# Patient Record
Sex: MALE | Race: WHITE | HISPANIC OR LATINO | Employment: FULL TIME | URBAN - METROPOLITAN AREA
[De-identification: names, ages, dates, MRNs, and addresses within clinical notes are randomized per-mention and may not be internally consistent; named-entity substitution may affect disease eponyms.]

---

## 2017-02-13 ENCOUNTER — HOSPITAL ENCOUNTER (EMERGENCY)
Facility: HOSPITAL | Age: 42
Discharge: HOME | End: 2017-02-13
Attending: EMERGENCY MEDICINE

## 2017-02-13 ENCOUNTER — APPOINTMENT (EMERGENCY)
Dept: CT IMAGING | Facility: HOSPITAL | Age: 42
End: 2017-02-13

## 2017-02-13 VITALS
HEART RATE: 76 BPM | DIASTOLIC BLOOD PRESSURE: 78 MMHG | TEMPERATURE: 98.1 F | OXYGEN SATURATION: 98 % | SYSTOLIC BLOOD PRESSURE: 126 MMHG | RESPIRATION RATE: 18 BRPM

## 2017-02-13 DIAGNOSIS — R10.9 RIGHT FLANK PAIN: Primary | ICD-10-CM

## 2017-02-13 LAB
ALBUMIN SERPL BCP-MCNC: 3.6 G/DL (ref 3.5–5)
ALP SERPL-CCNC: 134 U/L (ref 46–116)
ALT SERPL W P-5'-P-CCNC: 45 U/L (ref 12–78)
ANION GAP SERPL CALCULATED.3IONS-SCNC: 6 MMOL/L (ref 4–13)
AST SERPL W P-5'-P-CCNC: 23 U/L (ref 5–45)
BASOPHILS # BLD AUTO: 0.05 THOUSANDS/ΜL (ref 0–0.1)
BASOPHILS NFR BLD AUTO: 1 % (ref 0–1)
BILIRUB SERPL-MCNC: 0.2 MG/DL (ref 0.2–1)
BUN SERPL-MCNC: 15 MG/DL (ref 5–25)
CALCIUM SERPL-MCNC: 8.5 MG/DL (ref 8.3–10.1)
CHLORIDE SERPL-SCNC: 106 MMOL/L (ref 100–108)
CLARITY, POC: CLEAR
CO2 SERPL-SCNC: 28 MMOL/L (ref 21–32)
COLOR, POC: YELLOW
CREAT SERPL-MCNC: 0.91 MG/DL (ref 0.6–1.3)
EOSINOPHIL # BLD AUTO: 0.2 THOUSAND/ΜL (ref 0–0.61)
EOSINOPHIL NFR BLD AUTO: 3 % (ref 0–6)
ERYTHROCYTE [DISTWIDTH] IN BLOOD BY AUTOMATED COUNT: 13.7 % (ref 11.6–15.1)
EXT BILIRUBIN, UA: NEGATIVE
EXT BLOOD URINE: NEGATIVE
EXT GLUCOSE, UA: NEGATIVE
EXT KETONES: NEGATIVE
EXT NITRITE, UA: NEGATIVE
EXT PH, UA: 6
EXT PROTEIN, UA: NEGATIVE
EXT SPECIFIC GRAVITY, UA: 1.01
EXT UROBILINOGEN: 0.2
GFR SERPL CREATININE-BSD FRML MDRD: >60 ML/MIN/1.73SQ M
GLUCOSE SERPL-MCNC: 98 MG/DL (ref 65–140)
HCT VFR BLD AUTO: 50.1 % (ref 36.5–49.3)
HGB BLD-MCNC: 16.8 G/DL (ref 12–17)
LYMPHOCYTES # BLD AUTO: 2.43 THOUSANDS/ΜL (ref 0.6–4.47)
LYMPHOCYTES NFR BLD AUTO: 31 % (ref 14–44)
MCH RBC QN AUTO: 28.3 PG (ref 26.8–34.3)
MCHC RBC AUTO-ENTMCNC: 33.5 G/DL (ref 31.4–37.4)
MCV RBC AUTO: 84 FL (ref 82–98)
MONOCYTES # BLD AUTO: 0.48 THOUSAND/ΜL (ref 0.17–1.22)
MONOCYTES NFR BLD AUTO: 6 % (ref 4–12)
NEUTROPHILS # BLD AUTO: 4.57 THOUSANDS/ΜL (ref 1.85–7.62)
NEUTS SEG NFR BLD AUTO: 59 % (ref 43–75)
PLATELET # BLD AUTO: 219 THOUSANDS/UL (ref 149–390)
PMV BLD AUTO: 11.2 FL (ref 8.9–12.7)
POTASSIUM SERPL-SCNC: 3.9 MMOL/L (ref 3.5–5.3)
PROT SERPL-MCNC: 6.8 G/DL (ref 6.4–8.2)
RBC # BLD AUTO: 5.94 MILLION/UL (ref 3.88–5.62)
SODIUM SERPL-SCNC: 140 MMOL/L (ref 136–145)
WBC # BLD AUTO: 7.73 THOUSAND/UL (ref 4.31–10.16)
WBC # BLD EST: NEGATIVE 10*3/UL

## 2017-02-13 PROCEDURE — 87491 CHLMYD TRACH DNA AMP PROBE: CPT | Performed by: PHYSICIAN ASSISTANT

## 2017-02-13 PROCEDURE — 99284 EMERGENCY DEPT VISIT MOD MDM: CPT

## 2017-02-13 PROCEDURE — 85025 COMPLETE CBC W/AUTO DIFF WBC: CPT | Performed by: PHYSICIAN ASSISTANT

## 2017-02-13 PROCEDURE — 36415 COLL VENOUS BLD VENIPUNCTURE: CPT | Performed by: PHYSICIAN ASSISTANT

## 2017-02-13 PROCEDURE — 81002 URINALYSIS NONAUTO W/O SCOPE: CPT | Performed by: PHYSICIAN ASSISTANT

## 2017-02-13 PROCEDURE — 74176 CT ABD & PELVIS W/O CONTRAST: CPT

## 2017-02-13 PROCEDURE — 96374 THER/PROPH/DIAG INJ IV PUSH: CPT

## 2017-02-13 PROCEDURE — 87591 N.GONORRHOEAE DNA AMP PROB: CPT | Performed by: PHYSICIAN ASSISTANT

## 2017-02-13 PROCEDURE — 80053 COMPREHEN METABOLIC PANEL: CPT | Performed by: PHYSICIAN ASSISTANT

## 2017-02-13 RX ORDER — KETOROLAC TROMETHAMINE 30 MG/ML
30 INJECTION, SOLUTION INTRAMUSCULAR; INTRAVENOUS ONCE
Status: COMPLETED | OUTPATIENT
Start: 2017-02-13 | End: 2017-02-13

## 2017-02-13 RX ORDER — NAPROXEN 500 MG/1
500 TABLET ORAL 2 TIMES DAILY WITH MEALS
Qty: 20 TABLET | Refills: 0 | Status: SHIPPED | OUTPATIENT
Start: 2017-02-13 | End: 2017-03-15

## 2017-02-13 RX ADMIN — KETOROLAC TROMETHAMINE 30 MG: 30 INJECTION, SOLUTION INTRAMUSCULAR at 15:10

## 2017-02-17 LAB
CHLAMYDIA DNA CVX QL NAA+PROBE: NORMAL
N GONORRHOEA DNA GENITAL QL NAA+PROBE: NORMAL

## 2018-07-18 ENCOUNTER — HOSPITAL ENCOUNTER (EMERGENCY)
Facility: HOSPITAL | Age: 43
Discharge: HOME | End: 2018-07-18
Attending: EMERGENCY MEDICINE

## 2018-07-18 VITALS
TEMPERATURE: 98.7 F | BODY MASS INDEX: 25.74 KG/M2 | WEIGHT: 164 LBS | HEART RATE: 70 BPM | DIASTOLIC BLOOD PRESSURE: 74 MMHG | SYSTOLIC BLOOD PRESSURE: 122 MMHG | OXYGEN SATURATION: 97 % | RESPIRATION RATE: 18 BRPM

## 2018-07-18 DIAGNOSIS — M25.50 JOINT PAIN: ICD-10-CM

## 2018-07-18 DIAGNOSIS — G89.29 CHRONIC PAIN: Primary | ICD-10-CM

## 2018-07-18 PROCEDURE — 99283 EMERGENCY DEPT VISIT LOW MDM: CPT

## 2018-07-18 RX ORDER — METHYLPREDNISOLONE 4 MG/1
TABLET ORAL
Qty: 21 TABLET | Refills: 0 | Status: SHIPPED | OUTPATIENT
Start: 2018-07-18 | End: 2018-07-18

## 2018-07-18 RX ORDER — METHYLPREDNISOLONE 4 MG/1
TABLET ORAL
Qty: 21 TABLET | Refills: 0 | Status: SHIPPED | OUTPATIENT
Start: 2018-07-18 | End: 2018-08-07

## 2018-07-18 NOTE — ED PROVIDER NOTES
History  Chief Complaint   Patient presents with    Joint Swelling     c/o increased pain/swelling of bilateral wrists/hands and ankles x 3 weeks  Pt stated "its been going on for 5 years now but its much worse now" pt denies N/V/D, fever/chills     This is a 78-year-old male patient who has had pain and swelling intermittently in his wrists and hands for approximately 5 years he has never been seen by a practitioner  He comes in today because he is having a flare-up of his pain and took nothing over-the-counter  Nothing makes it better or worse he is eating and drinking no fever no chills no headache no blurred vision or double vision no cough congestion sore throat no nausea vomiting diarrhea abdominal pain no weight loss  No redness of the joint  History and physical were done with the  which is his significant other  She states that they brought him in today because he is having a flare-up of his pain  I did explain to them due to fact that this is 9years old and there is no obvious swelling or redness of the joints that at this time I have no workup offer them because this is a chronic condition that is not life threatening but I would be willing to give him something for the inflammatory portion that he describes and refer him to a family doctor who can do a proper workup of chronic pain  Patient is nontoxic in no acute distress eating drinking making urine and stool eating drinking normally            Prior to Admission Medications   Prescriptions Last Dose Informant Patient Reported? Taking?   naproxen (EC NAPROSYN) 500 MG EC tablet   No No   Sig: Take 1 tablet (500 mg total) by mouth 2 (two) times a day with meals for 10 days  Facility-Administered Medications: None       History reviewed  No pertinent past medical history  History reviewed  No pertinent surgical history  History reviewed  No pertinent family history    I have reviewed and agree with the history as documented  Social History   Substance Use Topics    Smoking status: Never Smoker    Smokeless tobacco: Never Used    Alcohol use No        Review of Systems   All other systems reviewed and are negative  Physical Exam  Physical Exam   Constitutional: He appears well-developed and well-nourished  HENT:   Head: Normocephalic and atraumatic  Right Ear: External ear normal    Left Ear: External ear normal    Nose: Nose normal    Mouth/Throat: Oropharynx is clear and moist    Eyes: Conjunctivae are normal  Pupils are equal, round, and reactive to light  Neck: Normal range of motion  Neck supple  Cardiovascular: Normal rate and regular rhythm  Pulmonary/Chest: Effort normal and breath sounds normal    Abdominal: Soft  Bowel sounds are normal  There is no tenderness  Musculoskeletal: Normal range of motion  He exhibits no edema, tenderness or deformity  Full range of motion of both joints no swelling no redness no warmth blood vessels were equally seen or palpated on both hands no neck pain   Neurological: He is alert  Skin: Skin is warm  Psychiatric: He has a normal mood and affect  His behavior is normal    Nursing note and vitals reviewed        Vital Signs  ED Triage Vitals [07/18/18 1350]   Temperature Pulse Respirations Blood Pressure SpO2   98 7 °F (37 1 °C) 70 18 122/74 97 %      Temp Source Heart Rate Source Patient Position - Orthostatic VS BP Location FiO2 (%)   Oral Monitor Sitting Left arm --      Pain Score       6           Vitals:    07/18/18 1350   BP: 122/74   Pulse: 70   Patient Position - Orthostatic VS: Sitting       Visual Acuity      ED Medications  Medications - No data to display    Diagnostic Studies  Results Reviewed     None                 No orders to display              Procedures  Procedures       Phone Contacts  ED Phone Contact    ED Course                               [facility]  CritCare Time    Disposition  Final diagnoses:   Chronic pain   Joint pain     Time reflects when diagnosis was documented in both MDM as applicable and the Disposition within this note     Time User Action Codes Description Comment    7/18/2018  3:30 PM Anthony Fabens [G89 29] Chronic pain     7/18/2018  3:30 PM Tomi[address]la [address] [M25 50] Joint pain       ED Disposition     ED Disposition Condition Comment    Discharge  [address] [facility] discharge to home/self care  Condition at discharge: Good        Follow-up Information     Follow up With Specialties Details Why [id number] Lexa Ceballos [facility], DO Internal Medicine Schedule an appointment as soon as possible for a visit  [address] [facility]  [phone]            Patient's Medications   Discharge Prescriptions    METHYLPREDNISOLONE 4 MG TBPK    Use as directed on package       Start Date: 7/18/2018 End Date: --       Order Dose: --       Quantity: 21 tablet    Refills: 0     No discharge procedures on file      ED Provider  Electronically Signed by           Keke Cedillo PA-C  07/18/18 Gia Mcleod PA-C  07/18/18 1172

## 2018-07-18 NOTE — DISCHARGE INSTRUCTIONS
Artralgia   LO QUE NECESITA SABER:   La artralgia es dolor en mayank o más articulaciones, dinah sin inflamación  El dolor podría ser de corta duración y mejorar dentro de 6 a 8 semanas  La artralgia puede ser mayank señal temprana de artritis  La artralgia puede ser causada por mayank condición médica mitzy un trastorno hormonal o un tumor  Además podría ser la consecuencia de mayank infección o Gini Dial  INSTRUCCIONES SOBRE EL DEBORAH HOSPITALARIA:   Medicamentos:  A usted le pueden  prescribir los siguientes medicamentos:  · El acetaminofén  braden el dolor  Pregunte la cantidad y la frecuencia con que debe tomarlos  [address]  El acetaminofén puede causar daño en el hígado cuando no se carley de forma correcta  · AINEs (Analgésicos antiinflamatorios no esteroides)  disminuyen el dolor y previenen la inflamación  Consulte con bustillo médico cuál medicamento es el adecuado para usted  Pregunte cuánto ward y cuándo  Tómelos mitzy se le indique  Cuando no se abdulaziz de la [employer], los medicamentos antiinflamatorios no esteroides pueden causar sangrado estomacal y problemas renales  · La crema para el alivio del dolor  braden el dolor  [address]  · [place] alexi medicamentos mitzy se le haya indicado  Consulte con bustillo médico si usted stan que bustillo medicamento no le está ayudando o si presenta efectos secundarios  Infórmele si es alérgico a algún medicamento  Mantenga mayank lista actualizada de los [place], las vitaminas y los productos herbales que carley  Incluya los siguientes datos de los medicamentos: cantidad, frecuencia y motivo de administración  Traiga con usted la lista o los envases de la píldoras a alexi citas de seguimiento  Lleve la lista de los medicamentos con usted en isidoro de mayank emergencia  Eric mayank anna marie de control con bustillo médico o especialista mitzy se lo indicaron:  Anote alexi preguntas para que se acuerde de hacerlas payal alexi visitas     Cuidados personales:   · La aplicación de calor  para ayudar a reducir el dolor  Use mayank compresa o envoltura caliente  Aplíquese calor de 20 a 30 minutos cada 2 horas payal los días que le indiquen  · Descanse  lo más posible  Evite actividades que causan [employer] articulaciones  · Aplique hielo  para ayudar a bajar la inflamación y el dolor  El hielo también puede contribuir a evitar el daño de los tejidos  Use un paquete de hielo o ponga hielo molido dentro de [employer]  Cúbrala con mayank toalla y póngasela en la articulación adolorida cada hora payal 15 o 20 minutos o según se le haya indicado  · [place]  la articulación con mayank férula o envoltura elástica según indicaciones  · Eleve  la articulación de [employer] que quede por encima del nivel de bustillo corazón tan seguido mitzy pueda para ayudar a reducir la inflamación y el dolor  Use almohadas o cobijas dobladas para elevar la articulación de forma cómoda  · Pierda peso  si tiene sobrepeso  El peso extra puede ejercer presión sobre alexi articulaciones y causarle más dolor  Consulte con bustillo médico cuánto debería pesar  También pídale que le ayude a diseñar un buen plan de pérdida de peso  · El ejercicio  con regularidad para ayudar a mejorar el movimiento de las articulaciones y disminuir el dolor  Pida más información acerca de un plan de ejercicio adecuado para usted  Los ejercicios de bajo impacto pueden ayudar a quitar algo de la presión en las articulaciones  Vianney Ogles de ejercicios de bajo impacto son caminar, nadar y practicar aeróbicos acuáticos  Fisioterapia:  Un fisioterapeuta le puede enseñar ejercicios para ayudarle a mejorar el movimiento y la fuerza, y para disminuir el dolor  Pregúntele a bustillo médico si la fisioterapia es apropiada para usted  Comuníquese con bustillo especialista o médico sí:   · Usted tiene fiebre  · Usted continúa sintiendo dolor en las articulaciones y el dolor no se le braden con calor, hielo o medicamento      · Usted tiene dolor e inflamación alrededor de la articulación  · Usted tiene preguntas o inquietudes acerca de simon condición o cuidado  Regrese a la sahra de emergencias si:   · Usted de repente siente un dolor severo cuando mueve la articulación  · Usted tiene fiebre y escalofríos cecilia  · Usted no puede  la articulación  · Usted pierde sensibilidad en el lado del cuerpo donde está la articulación adolorida  © 2017 [address] Information is for End User's use only and may not be sold, redistributed or otherwise used for commercial purposes  All illustrations and images included in CareNotes® are the copyrighted property of A D A M , Inc  or Adriel Lizarraga  Esta información es sólo para uso en educación  Simon intención no es darle un consejo médico sobre enfermedades o tratamientos  Colsulte con simon [place] farmacéutico antes de seguir cualquier régimen médico para saber si es seguro y efectivo para usted  Dolor crónico   LO QUE NECESITA SABER:   El dolor crónico es un dolor que no mejora payal 3 meses o [place]  El dolor crónico podría doler todo el [place], o ir y venir  INSTRUCCIONES SOBRE EL DEBORAH HOSPITALARIA:   Llame al 911 o dada que alguien llame al 911 en cualquiera de los siguientes casos:   · Usted está respirando más lento de lo normal, o tiene dificultad para respirar  · No lo pueden despertar  · Usted sufre mayank convulsión  Regrese a la sahra de emergencias si:   · Simon corazón late más lento de lo normal     · Usted siente que simon corazón se le va a salir del pecho o siente palpitaciones  · No puede pensar con claridad  Pregúntele a simon [employer] vitaminas y minerales son adecuados para usted  · Usted tiene efectos secundarios debido al [place] prescrito para el dolor, mitzy comezón, náusea o vómito  · Usted tiene dificultad para dormir  · Simon dolor empeora, incluso después de ward [employer]      · Usted no stan que el medicamento esté funcionando  · Usted tiene preguntas o inquietudes acerca de bustillo condición o cuidado  Medicamentos:  Es posible que usted necesite alguno de los siguientes:  · El acetaminofén  braden el dolor  Está disponible sin receta médica  Pregunte la cantidad y la frecuencia con que debe tomarlos  [address]  Tiffanie las etiquetas de todos los demás medicamentos que esté usando para saber si también contienen acetaminofén, o pregunte a bustillo médico o farmacéutico  El acetaminofén puede causar daño en el hígado cuando no se carley de forma correcta  No use más de 4 gramos (4000 miligramos) en total de acetaminofeno en un día  · AINEs (Analgésicos antiinflamatorios no esteroides) mitzy el ibuprofeno, ayudan a disminuir la inflamación, el dolor y la [place]  Adela medicamento esta disponible con o sin mayank receta médica  Los AINEs pueden causar sangrado estomacal o problemas renales en ciertas personas  Si usted carley un medicamento anticoagulante, siempre pregúntele a bustillo médico si los FRANCISCO son seguros para usted  Siempre tiffanie la etiqueta de adela medicamento y [place] instrucciones  · Un medicamento con receta para el dolor  conocidos mitzy narcóticos u opioides, podrían administrarse  Pregunte al médico cómo debe ward adela medicamento de forma dinh  · Los anestésicos  se pueden frotar sobre la piel o se inyectan en un nervio o músculo para entumecer el área  · Otros medicamentos  podrían reducir [employer], la ansiedad, la tensión muscular, o la inflamación  · [place] alexi medicamentos mitzy se le haya indicado  Consulte con bustillo médico si usted stan que bustillo medicamento no le está ayudando o si presenta efectos secundarios  Infórmele si es alérgico a algún medicamento  Mantenga mayank lista actualizada de los [employer], las vitaminas y los productos herbales que carley  Incluya los siguientes datos de los medicamentos: cantidad, frecuencia y motivo de administración   Larraine Ranger con usted la lista o los envases de la píldoras a alexi citas de seguimiento  Lleve la lista de los medicamentos con usted en isidoro de mayank emergencia  Controle bustillo dolor crónico:   · La aplicación de calor  sobre el área dolorida payal 20 a 30 minutos cada 2 horas por tantos días mitzy se le haya indicado  El calor ayuda a disminuir el dolor y los espasmos musculares  · Aplique hielo  sobre la parte del cuerpo que le duele payal 15 a 20 minutos [address] indicaciones  Use un paquete de hielo o ponga hielo molido dentro de [employer]  Cúbrala con mayank toalla  El hielo disminuye el dolor y la inflamación y ayuda a evitar daño a los tejidos  · Vaya a terapia física según indicaciones  Un fisioterapeuta le puede enseñar ejercicios para ayudarle a mejorar el movimiento y la fuerza, y para disminuir el dolor  · Eric ejercicio por 30 minutos, 3 veces a la semana  La actividad física regular puede ayudar a disminuir el dolor y mejorar bustillo calidad de hanna  Consulte con bustillo médico acerca del mejor plan de ejercicios para bustillo tipo de dolor  · Duerma lo suficiente  Tenga mayank rutina relajante para la hora de dormir  Lyndy Lunch a dormirse y levántese a la misma hora todos los días  Evite la cafeína en la tarde  · Hable con un consejero o terapeuta  Un tipo de terapia llamada terapia cognitivo conductual (TCC) puede ayudar a bustillo dolor crónico, cambiando bustillo forma de pensar al [employer]  La TCC también puede mejorar bustillo estado de ánimo, el sueño y la capacidad de [place]  Lo que usted debe saber si carley medicamentos narcóticos para el dolor:   · Es posible que usted necesite ward un suavizante de evacuaciones intestinales  El efecto secundario más común de los medicamentos prescritos para el dolor es el estreñimiento  Los suavizantes de evacuaciones intestinales están disponibles sin receta médica  · No mezcle los medicamentos prescritos para el dolor    Estos pueden provocar mayank sobredosis de [place], lo que puede llegar a ser mortal  Saldana Bugler etiquetas  Asegúrese de [employer] ingredientes de todos alexi medicamentos  · No consuma alcohol  cuando tome medicamentos para el dolor de venta bajo receta  Es peligroso mezclar narcóticos u opioides con alcohol o con drogas ilegales  · Los medicamentos prescritos para el dolor podrían afectar bustillo capacidad para conducir o trabajar con seguridad  También podrían provocar mareos y aumentar bustillo riesgo de caídas  · Guarde los medicamentos prescritos para el dolor en un lugar seguro en bustillo casa  Mantenga bustillo medicamento alejado de los niños y de otras personas  Nunca comparta alexi medicamentos con [employer]  Acuda a alexi consultas de control con bustillo médico según le indicaron  Usted podría ser referido a un especialista del dolor  Anote alexi preguntas para que se acuerde de hacerlas payal alexi visitas  © 2017 [address]  Information is for End User's use only and may not be sold, redistributed or otherwise used for commercial purposes  All illustrations and images included in CareNotes® are the copyrighted property of A D A M , Inc  or Adriel Lizarraga  Esta información es sólo para uso en educación  Bustillo intención no es darle un consejo médico sobre enfermedades o tratamientos  Colsulte con bustillo Dorna Chandler farmacéutico antes de seguir cualquier régimen médico para saber si es seguro y efectivo para usted

## 2018-08-07 ENCOUNTER — HOSPITAL ENCOUNTER (EMERGENCY)
Facility: HOSPITAL | Age: 43
Discharge: HOME | End: 2018-08-07
Attending: EMERGENCY MEDICINE

## 2018-08-07 VITALS
TEMPERATURE: 98.5 F | OXYGEN SATURATION: 98 % | SYSTOLIC BLOOD PRESSURE: 131 MMHG | DIASTOLIC BLOOD PRESSURE: 79 MMHG | HEART RATE: 80 BPM | RESPIRATION RATE: 16 BRPM

## 2018-08-07 DIAGNOSIS — L23.7 POISON IVY DERMATITIS: Primary | ICD-10-CM

## 2018-08-07 PROCEDURE — 99283 EMERGENCY DEPT VISIT LOW MDM: CPT

## 2018-08-07 RX ORDER — DIPHENHYDRAMINE HCL 25 MG
25 TABLET ORAL EVERY 6 HOURS PRN
Qty: 20 TABLET | Refills: 0 | Status: SHIPPED | OUTPATIENT
Start: 2018-08-07 | End: 2019-05-02 | Stop reason: ALTCHOICE

## 2018-08-07 RX ORDER — PREDNISONE 20 MG/1
60 TABLET ORAL DAILY
Qty: 15 TABLET | Refills: 0 | Status: SHIPPED | OUTPATIENT
Start: 2018-08-07 | End: 2019-05-02 | Stop reason: ALTCHOICE

## 2018-08-07 NOTE — ED PROVIDER NOTES
History  Chief Complaint   Patient presents with   [address]     pt doing yard work today, contact w/poison ivy; has red itchy rash B/ue, no drainage @this time     38 y/o male presents today complaining of a rash which he thinks is poison ivy on b/l upper extremities  Was working outside today and believes he came in contact with poison ivy  Did not put anything on the rash at this time  History provided by:  Patient   used: Yes    Poison Ivy   Location:  B/l upper extremities  Quality:  Itchy  Severity:  Unable to specify  Onset quality:  Sudden  Timing:  Constant  Progression:  Unchanged  Chronicity:  New  Associated symptoms: rash    Associated symptoms: no fever, no shortness of breath and no wheezing        Prior to Admission Medications   Prescriptions Last Dose Informant Patient Reported? Taking?   naproxen (EC NAPROSYN) 500 MG EC tablet   No No   Sig: Take 1 tablet (500 mg total) by mouth 2 (two) times a day with meals for 10 days  Facility-Administered Medications: None       History reviewed  No pertinent past medical history  History reviewed  No pertinent surgical history  History reviewed  No pertinent family history  I have reviewed and agree with the history as documented  Social History   Substance Use Topics    Smoking status: Never Smoker    Smokeless tobacco: Never Used    Alcohol use No        Review of Systems   Constitutional: Negative for fever  Respiratory: Negative for shortness of breath and wheezing  Skin: Positive for rash  Physical Exam  Physical Exam   Constitutional: He is oriented to person, place, and time  He appears well-developed and well-nourished  No distress  Musculoskeletal: Normal range of motion  He exhibits no edema  Neurological: He is alert and oriented to person, place, and time  Skin: Skin is warm and dry  Capillary refill takes less than 2 seconds  Rash (papular, b/l upper extremity) noted  Nursing note and vitals reviewed  Vital Signs  ED Triage Vitals [08/07/18 1921]   Temperature Pulse Respirations Blood Pressure SpO2   98 5 °F (36 9 °C) 80 16 131/79 98 %      Temp Source Heart Rate Source Patient Position - Orthostatic VS BP Location FiO2 (%)   Oral -- Sitting Left arm --      Pain Score       2           Vitals:    08/07/18 1921   BP: 131/79   Pulse: 80   Patient Position - Orthostatic VS: Sitting       Visual Acuity      ED Medications  Medications - No data to display    Diagnostic Studies  Results Reviewed     None                 No orders to display              Procedures  Procedures       Phone Contacts  ED Phone Contact    ED Course                               MDM  Number of Diagnoses or Management Options  Poison ivy dermatitis: minor  Risk of Complications, Morbidity, and/or Mortality  Presenting problems: minimal  Diagnostic procedures: minimal  Management options: minimal    Patient Progress  Patient progress: stable    CritCare Time    Disposition  Final diagnoses:   Poison ivy dermatitis     Time reflects when diagnosis was documented in both MDM as applicable and the Disposition within this note     Time User Action Codes Description Comment    8/7/2018  7:36 PM Xavier Duke Add [L23 7] Poison ivy dermatitis       ED Disposition     ED Disposition Condition Comment    Discharge  [facility] discharge to home/self care      Condition at discharge: Stable        Follow-up Information     Follow up With Specialties Details Why [id number] Lexa Aguilera, DO Internal Medicine Schedule an appointment as soon as possible for a visit  [address] [place]  [phone]            Discharge Medication List as of 8/7/2018  7:39 PM      START taking these medications    Details   diphenhydrAMINE (BENADRYL) 25 mg tablet Take 1 tablet (25 mg total) by mouth every 6 (six) hours as needed for itching, Starting Tue 8/7/2018, Print      predniSONE 20 mg tablet Take 3 tablets (60 mg total) by mouth daily, Starting Tue 8/7/2018, Print         CONTINUE these medications which have NOT CHANGED    Details   naproxen (EC NAPROSYN) 500 MG EC tablet Take 1 tablet (500 mg total) by mouth 2 (two) times a day with meals for 10 days  , Starting 4/30/2016, Until Tue 2/13/18, Print           No discharge procedures on file      ED Provider  Electronically Signed by           Addie Barajas DO  08/07/18 3409

## 2018-08-07 NOTE — DISCHARGE INSTRUCTIONS
Dermatitis por contacto   LO QUE NECESITA SABER:   La dermatitis de contacto es un sarpullido  Se desarrolla cuando usted toca algo que irrita bustillo piel o que provoca mayank reacción alérgica  INSTRUCCIONES SOBRE EL DEBORAH HOSPITALARIA:   Llame al 911 en isidoro de presentar lo siguiente:   · Usted tiene dificultad repentina para respirar  · Bustillo garganta se inflama y tiene dificultad para comer  · Bustillo fabien está inflamada  Pregúntele a bustillo Wenda Gamma vitaminas y minerales son adecuados para usted  · Usted tiene fiebre  · Alexi ampollas están drenando pus  · Bustillo sarpullido se propaga o no mejora aún después del tratamiento  · Usted tiene preguntas o inquietudes acerca de bustillo condición o cuidado  Medicamentos:   · Medicamentos,  ayudan a disminuir la comezón y la inflamación  Los medicamentos serán de uso tópico para aplicarse sobre bustillo salpullido o en píldora  · [place] alexi medicamentos mitzy se le haya indicado  Consulte con bustillo médico si usted stan que bustillo medicamento no le está ayudando o si presenta efectos secundarios  Infórmele si es alérgico a cualquier medicamento  Mantenga mayank lista actualizada de los [place], las vitaminas y los productos herbales que carley  Incluya los siguientes datos de los medicamentos: cantidad, frecuencia y motivo de administración  Traiga con usted la lista o los envases de la píldoras a alexi citas de seguimiento  Lleve la lista de los medicamentos con usted en isidoro de mayank emergencia  Controle bustillo dermatitis de contacto:   · Rembert shahab de [place] o duchas cortas en agua fría  Use jabón suave o algún limpiador que no tenga jabón  Agregue chanelle, bicarbonato de sodio o harina de maíz al agua de la [place] para ayudar a disminuir la irritación en la piel  · Evite irritantes de la piel , mitzy [place], productos para el nikki, jabones y limpiadores  Use productos que no contengan perfume o tintes  · Aplique mayank compresa fría a bustillo sarpullido  [place] ayudará a aliviar bustillo piel  · Mantenga simon piel húmeda  Frote crema o loción sin perfume en simon piel para impedir la resequedad y comezón  Eric esto tan pronto termine de bañarse o ducharse cuando simon piel aún esté mojada  Programe mayank anna marie con simon médico o dermatólogo dentro de 2 a 3 días:  Anote alexi preguntas para que se acuerde de hacerlas payal alexi visitas  © 2017 [address]  Information is for End User's use only and may not be sold, redistributed or otherwise used for commercial purposes  All illustrations and images included in CareNotes® are the copyrighted property of A D A M , Inc  or Adriel Lizarraga  Esta información es sólo para uso en educación  Simon intención no es darle un consejo médico sobre enfermedades o tratamientos  Colsulte con simon Melven Rimes farmacéutico antes de seguir cualquier régimen médico para saber si es seguro y efectivo para usted

## 2018-08-07 NOTE — ED NOTES
Pt discharge instructions reviewed by provider Dr Bobo Watson, Pt has no further questions at this time  Pt awake and alert, no signs of acute distress noted  Pt ambulated out of ED with a steady gait       French Bird RN  08/07/18 0714

## 2019-05-01 RX ORDER — CLOTRIMAZOLE 1 %
CREAM (GRAM) TOPICAL 2 TIMES DAILY
COMMUNITY
Start: 2014-07-31 | End: 2019-05-02 | Stop reason: ALTCHOICE

## 2019-05-01 RX ORDER — OMEPRAZOLE 20 MG/1
1 CAPSULE, DELAYED RELEASE ORAL DAILY
COMMUNITY
Start: 2014-07-31 | End: 2019-05-02 | Stop reason: ALTCHOICE

## 2019-05-01 RX ORDER — SULFAMETHOXAZOLE AND TRIMETHOPRIM 400; 80 MG/1; MG/1
1 TABLET ORAL 2 TIMES DAILY
COMMUNITY
Start: 2015-09-18 | End: 2019-05-02 | Stop reason: ALTCHOICE

## 2019-05-01 RX ORDER — CITALOPRAM 20 MG/1
1 TABLET ORAL DAILY
COMMUNITY
Start: 2015-09-18 | End: 2019-05-02 | Stop reason: ALTCHOICE

## 2019-05-02 ENCOUNTER — OFFICE VISIT (OUTPATIENT)
Dept: INTERNAL MEDICINE CLINIC | Facility: CLINIC | Age: 44
End: 2019-05-02

## 2019-05-02 VITALS
WEIGHT: 165.34 LBS | TEMPERATURE: 98.5 F | SYSTOLIC BLOOD PRESSURE: 104 MMHG | BODY MASS INDEX: 25.95 KG/M2 | HEART RATE: 72 BPM | DIASTOLIC BLOOD PRESSURE: 80 MMHG | HEIGHT: 67 IN

## 2019-05-02 DIAGNOSIS — M25.50 ARTHRALGIA, UNSPECIFIED JOINT: Primary | Chronic | ICD-10-CM

## 2019-05-02 DIAGNOSIS — M25.40 JOINT SWELLING: ICD-10-CM

## 2019-05-02 DIAGNOSIS — G89.29 CHRONIC RIGHT-SIDED LOW BACK PAIN WITH RIGHT-SIDED SCIATICA: Chronic | ICD-10-CM

## 2019-05-02 DIAGNOSIS — E66.3 OVERWEIGHT (BMI 25.0-29.9): Chronic | ICD-10-CM

## 2019-05-02 DIAGNOSIS — M54.41 CHRONIC RIGHT-SIDED LOW BACK PAIN WITH RIGHT-SIDED SCIATICA: Chronic | ICD-10-CM

## 2019-05-02 PROBLEM — R53.82 CHRONIC FATIGUE: Chronic | Status: ACTIVE | Noted: 2019-05-02

## 2019-05-02 PROCEDURE — 99204 OFFICE O/P NEW MOD 45 MIN: CPT | Performed by: HOSPITALIST

## 2019-05-02 RX ORDER — METHOCARBAMOL 750 MG/1
750 TABLET, FILM COATED ORAL 2 TIMES DAILY PRN
Qty: 20 TABLET | Refills: 0 | Status: SHIPPED | OUTPATIENT
Start: 2019-05-02 | End: 2019-05-12

## 2019-05-31 ENCOUNTER — APPOINTMENT (OUTPATIENT)
Dept: LAB | Facility: CLINIC | Age: 44
End: 2019-05-31

## 2019-05-31 ENCOUNTER — HOSPITAL ENCOUNTER (OUTPATIENT)
Dept: RADIOLOGY | Facility: HOSPITAL | Age: 44
Discharge: HOME | End: 2019-05-31

## 2019-05-31 DIAGNOSIS — M25.40 JOINT SWELLING: ICD-10-CM

## 2019-05-31 DIAGNOSIS — G89.29 CHRONIC RIGHT-SIDED LOW BACK PAIN WITH RIGHT-SIDED SCIATICA: Chronic | ICD-10-CM

## 2019-05-31 DIAGNOSIS — M54.41 CHRONIC RIGHT-SIDED LOW BACK PAIN WITH RIGHT-SIDED SCIATICA: Chronic | ICD-10-CM

## 2019-05-31 DIAGNOSIS — M25.50 ARTHRALGIA, UNSPECIFIED JOINT: Chronic | ICD-10-CM

## 2019-05-31 LAB
ALBUMIN SERPL BCP-MCNC: 3.9 G/DL (ref 3.5–5)
ALP SERPL-CCNC: 129 U/L (ref 46–116)
ALT SERPL W P-5'-P-CCNC: 48 U/L (ref 12–78)
ANION GAP SERPL CALCULATED.3IONS-SCNC: 8 MMOL/L (ref 4–13)
AST SERPL W P-5'-P-CCNC: 26 U/L (ref 5–45)
BASOPHILS # BLD AUTO: 0.05 THOUSANDS/ΜL (ref 0–0.1)
BASOPHILS NFR BLD AUTO: 1 % (ref 0–1)
BILIRUB SERPL-MCNC: 0.6 MG/DL (ref 0.2–1)
BUN SERPL-MCNC: 19 MG/DL (ref 5–25)
CALCIUM SERPL-MCNC: 8.9 MG/DL (ref 8.3–10.1)
CHLORIDE SERPL-SCNC: 103 MMOL/L (ref 100–108)
CHOLEST SERPL-MCNC: 175 MG/DL (ref 50–200)
CO2 SERPL-SCNC: 27 MMOL/L (ref 21–32)
CREAT SERPL-MCNC: 0.8 MG/DL (ref 0.6–1.3)
EOSINOPHIL # BLD AUTO: 0.15 THOUSAND/ΜL (ref 0–0.61)
EOSINOPHIL NFR BLD AUTO: 2 % (ref 0–6)
ERYTHROCYTE [DISTWIDTH] IN BLOOD BY AUTOMATED COUNT: 13.2 % (ref 11.6–15.1)
GFR SERPL CREATININE-BSD FRML MDRD: 107 ML/MIN/1.73SQ M
GLUCOSE P FAST SERPL-MCNC: 97 MG/DL (ref 65–99)
HCT VFR BLD AUTO: 50.4 % (ref 36.5–49.3)
HDLC SERPL-MCNC: 49 MG/DL (ref 40–60)
HGB BLD-MCNC: 17 G/DL (ref 12–17)
IMM GRANULOCYTES # BLD AUTO: 0.03 THOUSAND/UL (ref 0–0.2)
IMM GRANULOCYTES NFR BLD AUTO: 1 % (ref 0–2)
LDLC SERPL CALC-MCNC: 113 MG/DL (ref 0–100)
LYMPHOCYTES # BLD AUTO: 2.22 THOUSANDS/ΜL (ref 0.6–4.47)
LYMPHOCYTES NFR BLD AUTO: 36 % (ref 14–44)
MCH RBC QN AUTO: 28.9 PG (ref 26.8–34.3)
MCHC RBC AUTO-ENTMCNC: 33.7 G/DL (ref 31.4–37.4)
MCV RBC AUTO: 86 FL (ref 82–98)
MONOCYTES # BLD AUTO: 0.43 THOUSAND/ΜL (ref 0.17–1.22)
MONOCYTES NFR BLD AUTO: 7 % (ref 4–12)
NEUTROPHILS # BLD AUTO: 3.34 THOUSANDS/ΜL (ref 1.85–7.62)
NEUTS SEG NFR BLD AUTO: 53 % (ref 43–75)
NRBC BLD AUTO-RTO: 0 /100 WBCS
PLATELET # BLD AUTO: 233 THOUSANDS/UL (ref 149–390)
PMV BLD AUTO: 10.7 FL (ref 8.9–12.7)
POTASSIUM SERPL-SCNC: 4 MMOL/L (ref 3.5–5.3)
PROT SERPL-MCNC: 7.6 G/DL (ref 6.4–8.2)
RBC # BLD AUTO: 5.88 MILLION/UL (ref 3.88–5.62)
RHEUMATOID FACT SER QL LA: NEGATIVE
SODIUM SERPL-SCNC: 138 MMOL/L (ref 136–145)
TRIGL SERPL-MCNC: 67 MG/DL
TSH SERPL DL<=0.05 MIU/L-ACNC: 0.91 UIU/ML (ref 0.36–3.74)
URATE SERPL-MCNC: 5.5 MG/DL (ref 4.2–8)
WBC # BLD AUTO: 6.22 THOUSAND/UL (ref 4.31–10.16)

## 2019-05-31 PROCEDURE — 85025 COMPLETE CBC W/AUTO DIFF WBC: CPT | Performed by: PHYSICIAN ASSISTANT

## 2019-05-31 PROCEDURE — 86038 ANTINUCLEAR ANTIBODIES: CPT

## 2019-05-31 PROCEDURE — 86430 RHEUMATOID FACTOR TEST QUAL: CPT

## 2019-05-31 PROCEDURE — 86618 LYME DISEASE ANTIBODY: CPT

## 2019-05-31 PROCEDURE — 86200 CCP ANTIBODY: CPT

## 2019-05-31 PROCEDURE — 72110 X-RAY EXAM L-2 SPINE 4/>VWS: CPT

## 2019-05-31 PROCEDURE — 36415 COLL VENOUS BLD VENIPUNCTURE: CPT | Performed by: PHYSICIAN ASSISTANT

## 2019-05-31 PROCEDURE — 80053 COMPREHEN METABOLIC PANEL: CPT | Performed by: PHYSICIAN ASSISTANT

## 2019-05-31 PROCEDURE — 84550 ASSAY OF BLOOD/URIC ACID: CPT

## 2019-05-31 PROCEDURE — 80061 LIPID PANEL: CPT | Performed by: PHYSICIAN ASSISTANT

## 2019-05-31 PROCEDURE — 84443 ASSAY THYROID STIM HORMONE: CPT | Performed by: PHYSICIAN ASSISTANT

## 2019-06-02 LAB
B BURGDOR IGG SER IA-ACNC: 0.39
B BURGDOR IGM SER IA-ACNC: 0.35
CCP IGA+IGG SERPL IA-ACNC: 8 UNITS (ref 0–19)

## 2019-06-03 LAB — RYE IGE QN: NEGATIVE

## 2019-06-18 ENCOUNTER — OFFICE VISIT (OUTPATIENT)
Dept: INTERNAL MEDICINE CLINIC | Facility: CLINIC | Age: 44
End: 2019-06-18

## 2019-06-18 VITALS
HEART RATE: 72 BPM | WEIGHT: 170.19 LBS | HEIGHT: 67 IN | SYSTOLIC BLOOD PRESSURE: 108 MMHG | BODY MASS INDEX: 26.71 KG/M2 | TEMPERATURE: 98.3 F | DIASTOLIC BLOOD PRESSURE: 68 MMHG

## 2019-06-18 DIAGNOSIS — E78.00 ELEVATED LDL CHOLESTEROL LEVEL: Chronic | ICD-10-CM

## 2019-06-18 DIAGNOSIS — G89.29 CHRONIC RIGHT-SIDED LOW BACK PAIN WITH RIGHT-SIDED SCIATICA: Primary | Chronic | ICD-10-CM

## 2019-06-18 DIAGNOSIS — M54.41 CHRONIC RIGHT-SIDED LOW BACK PAIN WITH RIGHT-SIDED SCIATICA: Primary | Chronic | ICD-10-CM

## 2019-06-18 PROBLEM — R53.82 CHRONIC FATIGUE: Chronic | Status: RESOLVED | Noted: 2019-05-02 | Resolved: 2019-06-18

## 2019-06-18 PROBLEM — M25.50 JOINT PAIN: Chronic | Status: RESOLVED | Noted: 2019-05-02 | Resolved: 2019-06-18

## 2019-06-18 PROBLEM — M25.40 JOINT SWELLING: Status: RESOLVED | Noted: 2019-05-02 | Resolved: 2019-06-18

## 2019-06-18 PROCEDURE — 99213 OFFICE O/P EST LOW 20 MIN: CPT | Performed by: PHYSICIAN ASSISTANT

## 2020-02-16 ENCOUNTER — HOSPITAL ENCOUNTER (EMERGENCY)
Facility: HOSPITAL | Age: 45
Discharge: HOME | End: 2020-02-16
Attending: EMERGENCY MEDICINE | Admitting: EMERGENCY MEDICINE

## 2020-02-16 VITALS
RESPIRATION RATE: 16 BRPM | WEIGHT: 167.8 LBS | DIASTOLIC BLOOD PRESSURE: 70 MMHG | HEART RATE: 72 BPM | TEMPERATURE: 97.4 F | BODY MASS INDEX: 26.68 KG/M2 | OXYGEN SATURATION: 97 % | SYSTOLIC BLOOD PRESSURE: 123 MMHG

## 2020-02-16 DIAGNOSIS — J10.1 INFLUENZA A: Primary | ICD-10-CM

## 2020-02-16 LAB
ALBUMIN SERPL BCP-MCNC: 3.1 G/DL (ref 3.5–5)
ALP SERPL-CCNC: 105 U/L (ref 46–116)
ALT SERPL W P-5'-P-CCNC: 39 U/L (ref 12–78)
ANION GAP SERPL CALCULATED.3IONS-SCNC: 10 MMOL/L (ref 4–13)
AST SERPL W P-5'-P-CCNC: 28 U/L (ref 5–45)
BASOPHILS # BLD AUTO: 0.03 THOUSANDS/ΜL (ref 0–0.1)
BASOPHILS NFR BLD AUTO: 1 % (ref 0–1)
BILIRUB SERPL-MCNC: 0.26 MG/DL (ref 0.2–1)
BUN SERPL-MCNC: 11 MG/DL (ref 5–25)
CALCIUM SERPL-MCNC: 7.5 MG/DL (ref 8.3–10.1)
CHLORIDE SERPL-SCNC: 103 MMOL/L (ref 100–108)
CO2 SERPL-SCNC: 24 MMOL/L (ref 21–32)
CREAT SERPL-MCNC: 1.02 MG/DL (ref 0.6–1.3)
EOSINOPHIL # BLD AUTO: 0.02 THOUSAND/ΜL (ref 0–0.61)
EOSINOPHIL NFR BLD AUTO: 0 % (ref 0–6)
ERYTHROCYTE [DISTWIDTH] IN BLOOD BY AUTOMATED COUNT: 13.2 % (ref 11.6–15.1)
FLUAV RNA NPH QL NAA+PROBE: DETECTED
FLUBV RNA NPH QL NAA+PROBE: ABNORMAL
GFR SERPL CREATININE-BSD FRML MDRD: 87 ML/MIN/1.73SQ M
GLUCOSE SERPL-MCNC: 97 MG/DL (ref 65–140)
HCT VFR BLD AUTO: 50.2 % (ref 36.5–49.3)
HGB BLD-MCNC: 16.6 G/DL (ref 12–17)
IMM GRANULOCYTES # BLD AUTO: 0.03 THOUSAND/UL (ref 0–0.2)
IMM GRANULOCYTES NFR BLD AUTO: 1 % (ref 0–2)
LYMPHOCYTES # BLD AUTO: 1.31 THOUSANDS/ΜL (ref 0.6–4.47)
LYMPHOCYTES NFR BLD AUTO: 26 % (ref 14–44)
MCH RBC QN AUTO: 29.3 PG (ref 26.8–34.3)
MCHC RBC AUTO-ENTMCNC: 33.1 G/DL (ref 31.4–37.4)
MCV RBC AUTO: 89 FL (ref 82–98)
MONOCYTES # BLD AUTO: 0.65 THOUSAND/ΜL (ref 0.17–1.22)
MONOCYTES NFR BLD AUTO: 13 % (ref 4–12)
NEUTROPHILS # BLD AUTO: 3.02 THOUSANDS/ΜL (ref 1.85–7.62)
NEUTS SEG NFR BLD AUTO: 59 % (ref 43–75)
NRBC BLD AUTO-RTO: 0 /100 WBCS
PLATELET # BLD AUTO: 158 THOUSANDS/UL (ref 149–390)
PMV BLD AUTO: 11 FL (ref 8.9–12.7)
POTASSIUM SERPL-SCNC: 3.2 MMOL/L (ref 3.5–5.3)
PROT SERPL-MCNC: 6.4 G/DL (ref 6.4–8.2)
RBC # BLD AUTO: 5.67 MILLION/UL (ref 3.88–5.62)
RSV RNA NPH QL NAA+PROBE: ABNORMAL
SODIUM SERPL-SCNC: 137 MMOL/L (ref 136–145)
WBC # BLD AUTO: 5.06 THOUSAND/UL (ref 4.31–10.16)

## 2020-02-16 PROCEDURE — 85025 COMPLETE CBC W/AUTO DIFF WBC: CPT | Performed by: EMERGENCY MEDICINE

## 2020-02-16 PROCEDURE — 80053 COMPREHEN METABOLIC PANEL: CPT | Performed by: EMERGENCY MEDICINE

## 2020-02-16 PROCEDURE — 96374 THER/PROPH/DIAG INJ IV PUSH: CPT

## 2020-02-16 PROCEDURE — 87631 RESP VIRUS 3-5 TARGETS: CPT | Performed by: EMERGENCY MEDICINE

## 2020-02-16 PROCEDURE — 99283 EMERGENCY DEPT VISIT LOW MDM: CPT

## 2020-02-16 PROCEDURE — 96375 TX/PRO/DX INJ NEW DRUG ADDON: CPT

## 2020-02-16 PROCEDURE — 99284 EMERGENCY DEPT VISIT MOD MDM: CPT | Performed by: EMERGENCY MEDICINE

## 2020-02-16 PROCEDURE — 96361 HYDRATE IV INFUSION ADD-ON: CPT

## 2020-02-16 PROCEDURE — 36415 COLL VENOUS BLD VENIPUNCTURE: CPT | Performed by: EMERGENCY MEDICINE

## 2020-02-16 RX ORDER — POTASSIUM CHLORIDE 20 MEQ/1
40 TABLET, EXTENDED RELEASE ORAL ONCE
Status: COMPLETED | OUTPATIENT
Start: 2020-02-16 | End: 2020-02-16

## 2020-02-16 RX ORDER — ONDANSETRON 2 MG/ML
4 INJECTION INTRAMUSCULAR; INTRAVENOUS ONCE
Status: COMPLETED | OUTPATIENT
Start: 2020-02-16 | End: 2020-02-16

## 2020-02-16 RX ORDER — KETOROLAC TROMETHAMINE 30 MG/ML
15 INJECTION, SOLUTION INTRAMUSCULAR; INTRAVENOUS ONCE
Status: COMPLETED | OUTPATIENT
Start: 2020-02-16 | End: 2020-02-16

## 2020-02-16 RX ORDER — OSELTAMIVIR PHOSPHATE 75 MG/1
75 CAPSULE ORAL 2 TIMES DAILY
Qty: 10 CAPSULE | Refills: 0 | Status: SHIPPED | OUTPATIENT
Start: 2020-02-16 | End: 2020-02-21

## 2020-02-16 RX ORDER — OSELTAMIVIR PHOSPHATE 75 MG/1
75 CAPSULE ORAL ONCE
Status: COMPLETED | OUTPATIENT
Start: 2020-02-16 | End: 2020-02-16

## 2020-02-16 RX ADMIN — OSELTAMIVIR PHOSPHATE 75 MG: 75 CAPSULE ORAL at 20:06

## 2020-02-16 RX ADMIN — SODIUM CHLORIDE 1000 ML: 0.9 INJECTION, SOLUTION INTRAVENOUS at 17:51

## 2020-02-16 RX ADMIN — POTASSIUM CHLORIDE 40 MEQ: 1500 TABLET, EXTENDED RELEASE ORAL at 19:04

## 2020-02-16 RX ADMIN — KETOROLAC TROMETHAMINE 15 MG: 30 INJECTION, SOLUTION INTRAMUSCULAR at 17:52

## 2020-02-16 RX ADMIN — ONDANSETRON 4 MG: 2 INJECTION INTRAMUSCULAR; INTRAVENOUS at 17:52

## 2020-02-16 NOTE — ED PROVIDER NOTES
History  Chief Complaint   Patient presents with    Flu Symptoms     Patient presents with flu symptoms and vomiting  Family (+) for flu  Patient is a 24-year-old male with no significant past medical history who presents with fever  Patient states that he developed symptoms yesterday  He has had fever, generalized myalgias, nausea, vomiting and headaches  Patient has been in bed for the most part since yesterday  He has had little p o  Intake  He has not taken any medications for his symptoms at home  He admits to sick contacts  His children have been sick with the flu  He has no other complaints at this time  History provided by:  Patient  Fever - 9 weeks to 74 years   Temp source:  Subjective  Duration:  2 days  Timing:  Constant  Progression:  Unchanged  Chronicity:  New  Ineffective treatments:  None tried  Associated symptoms: chills, cough, diarrhea, myalgias, nausea and vomiting    Associated symptoms: no chest pain, no dysuria, no headaches, no rash and no sore throat        Prior to Admission Medications   Prescriptions Last Dose Informant Patient Reported? Taking? methocarbamol (ROBAXIN) 750 mg tablet   No No   Sig: Take 1 tablet (750 mg total) by mouth 2 (two) times a day as needed for muscle spasms for up to 10 days Do not drive with this medication      Facility-Administered Medications: None       History reviewed  No pertinent past medical history  Past Surgical History:   Procedure Laterality Date    CYSTOSCOPY      Diagnostic  Last assessed 7/31/2014        Family History   Problem Relation Age of Onset    Arthritis Mother     Stroke Father     Heart disease Father     No Known Problems Sister     No Known Problems Brother     No Known Problems Son     No Known Problems Daughter     No Known Problems Sister     No Known Problems Brother      I have reviewed and agree with the history as documented      Social History     Tobacco Use    Smoking status: Never Smoker  Smokeless tobacco: Never Used   Substance Use Topics    Alcohol use: No    Drug use: Never       Review of Systems   Constitutional: Positive for chills and fever  Negative for diaphoresis  HENT: Negative for nosebleeds, sore throat and trouble swallowing  Eyes: Negative for photophobia, pain and visual disturbance  Respiratory: Positive for cough  Negative for chest tightness and shortness of breath  Cardiovascular: Negative for chest pain, palpitations and leg swelling  Gastrointestinal: Positive for diarrhea, nausea and vomiting  Negative for abdominal pain and constipation  Endocrine: Negative for polydipsia and polyuria  Genitourinary: Negative for difficulty urinating, dysuria and hematuria  Musculoskeletal: Positive for myalgias  Negative for back pain, neck pain and neck stiffness  Skin: Negative for pallor and rash  Neurological: Negative for dizziness, syncope, light-headedness and headaches  All other systems reviewed and are negative  Physical Exam  Physical Exam   Constitutional: He is oriented to person, place, and time  He appears well-developed and well-nourished  No distress  HENT:   Head: Normocephalic and atraumatic  Mouth/Throat: Oropharynx is clear and moist and mucous membranes are normal    Eyes: Pupils are equal, round, and reactive to light  EOM are normal    Neck: Normal range of motion  Neck supple  Cardiovascular: Normal rate, regular rhythm, normal heart sounds, intact distal pulses and normal pulses  Pulmonary/Chest: Effort normal and breath sounds normal  No respiratory distress  Abdominal: Soft  He exhibits no distension  There is no tenderness  There is no rigidity, no rebound and no guarding  Musculoskeletal: Normal range of motion  He exhibits no edema or tenderness  Lymphadenopathy:     He has no cervical adenopathy  Neurological: He is alert and oriented to person, place, and time  He has normal strength   No cranial nerve deficit or sensory deficit  Skin: Skin is warm and dry  Capillary refill takes less than 2 seconds  Psychiatric: He has a normal mood and affect  Nursing note and vitals reviewed        Vital Signs  ED Triage Vitals [02/16/20 1722]   Temperature Pulse Respirations Blood Pressure SpO2   (!) 97 4 °F (36 3 °C) 87 18 126/74 97 %      Temp Source Heart Rate Source Patient Position - Orthostatic VS BP Location FiO2 (%)   Oral Monitor Sitting Left arm --      Pain Score       Worst Possible Pain           Vitals:    02/16/20 1722 02/16/20 1906   BP: 126/74 123/70   Pulse: 87 72   Patient Position - Orthostatic VS: Sitting Sitting         Visual Acuity      ED Medications  Medications   sodium chloride 0 9 % bolus 1,000 mL (0 mL Intravenous Stopped 2/16/20 1906)   ondansetron (ZOFRAN) injection 4 mg (4 mg Intravenous Given 2/16/20 1752)   ketorolac (TORADOL) injection 15 mg (15 mg Intravenous Given 2/16/20 1752)   potassium chloride (K-DUR,KLOR-CON) CR tablet 40 mEq (40 mEq Oral Given 2/16/20 1904)   oseltamivir (TAMIFLU) capsule 75 mg (75 mg Oral Given 2/16/20 2006)       Diagnostic Studies  Results Reviewed     Procedure Component Value Units Date/Time    Comprehensive metabolic panel [[id number]]  (Abnormal) Collected:  02/16/20 1819    Lab Status:  Final result Specimen:  Blood from Hand, Right Updated:  02/16/20 1846     Sodium 137 mmol/L      Potassium 3 2 mmol/L      Chloride 103 mmol/L      CO2 24 mmol/L      ANION GAP 10 mmol/L      BUN 11 mg/dL      Creatinine 1 02 mg/dL      Glucose 97 mg/dL      Calcium 7 5 mg/dL      AST 28 U/L      ALT 39 U/L      Alkaline Phosphatase 105 U/L      Total Protein 6 4 g/dL      Albumin 3 1 g/dL      Total Bilirubin 0 26 mg/dL      eGFR 87 ml/min/1 73sq m     Narrative:       [place] guidelines for Chronic Kidney Disease (CKD):     Stage 1 with normal or high GFR (GFR > 90 mL/min/1 73 square meters)    Stage 2 Mild CKD (GFR = 60-89 mL/min/1 73 square meters)    Stage 3A Moderate CKD (GFR = 45-59 mL/min/1 73 square meters)    Stage 3B Moderate CKD (GFR = 30-44 mL/min/1 73 square meters)    Stage 4 Severe CKD (GFR = 15-29 mL/min/1 73 square meters)    Stage 5 End Stage CKD (GFR <15 mL/min/1 73 square meters)  Note: GFR calculation is accurate only with a steady state creatinine    Influenza A/B and RSV PCR [[id number]]  (Abnormal) Collected:  02/16/20 1751    Lab Status:  Final result Specimen:  Nose Updated:  02/16/20 1831     INFLUENZA A PCR Detected     INFLUENZA B PCR None Detected     RSV PCR None Detected    CBC and differential [[id number]]  (Abnormal) Collected:  02/16/20 1751    Lab Status:  Final result Specimen:  Blood from Arm, Right Updated:  02/16/20 1803     WBC 5 06 Thousand/uL      RBC 5 67 Million/uL      Hemoglobin 16 6 g/dL      Hematocrit 50 2 %      MCV 89 fL      MCH 29 3 pg      MCHC 33 1 g/dL      RDW 13 2 %      MPV 11 0 fL      Platelets 277 Thousands/uL      nRBC 0 /100 WBCs      Neutrophils Relative 59 %      Immat GRANS % 1 %      Lymphocytes Relative 26 %      Monocytes Relative 13 %      Eosinophils Relative 0 %      Basophils Relative 1 %      Neutrophils Absolute 3 02 Thousands/µL      Immature Grans Absolute 0 03 Thousand/uL      Lymphocytes Absolute 1 31 Thousands/µL      Monocytes Absolute 0 65 Thousand/µL      Eosinophils Absolute 0 02 Thousand/µL      Basophils Absolute 0 03 Thousands/µL                  No orders to display              Procedures  Procedures         ED Course  ED Course as of Feb 16 2346   Sun Feb 16, 2020   1935 Patient states he is feeling better  Will discharge on Tamiflu  MDM  Number of Diagnoses or Management Options  Influenza A: new and requires workup  Diagnosis management comments: Patient presents with symptoms consistent with influenza  He did test positive for influenza a  His symptoms started less than 48 hours ago  Will treat with Tamiflu    His vitals stable and he is nontoxic appearing  He is stable for discharge in outpatient follow-up  Strict return precautions given  Amount and/or Complexity of Data Reviewed  Clinical lab tests: ordered and reviewed  Tests in the medicine section of CPT®: ordered and reviewed  Review and summarize past medical records: yes  Independent visualization of images, tracings, or specimens: yes    Risk of Complications, Morbidity, and/or Mortality  Presenting problems: moderate  Diagnostic procedures: low  Management options: moderate    Patient Progress  Patient progress: stable        Disposition  Final diagnoses:   Influenza A     Time reflects when diagnosis was documented in both MDM as applicable and the Disposition within this note     Time User Action Codes Description Comment    2/16/2020  7:30 PM Caitie Hunt Add [J10 1] Influenza A       ED Disposition     ED Disposition Condition Date/Time Comment    Discharge Stable [place] Feb 16, 2020  7:30 PM [facility] discharge to home/self care  Follow-up Information     Follow up With Specialties Details Why Contact Info    Paige Amaya PA-C Internal Medicine, Physician Assistant Schedule an appointment as soon as possible for a visit  Return to ED sooner if symptoms worsen or persist  [facility] [address] [phone]            Discharge Medication List as of 2/16/2020  7:31 PM      START taking these medications    Details   oseltamivir (TAMIFLU) 75 mg capsule Take 1 capsule (75 mg total) by mouth 2 (two) times a day for 5 days, Starting Sun 2/16/2020, Until Fri 2/21/2020, Normal         CONTINUE these medications which have NOT CHANGED    Details   methocarbamol (ROBAXIN) 750 mg tablet Take 1 tablet (750 mg total) by mouth 2 (two) times a day as needed for muscle spasms for up to 10 days Do not drive with this medication, Starting Thu 5/2/2019, Until Sun 5/12/2019, Normal           No discharge procedures on file      PDMP Review     None          ED Provider  Electronically Signed by           Rolf Alcantar DO  02/16/20 6245

## 2020-11-11 ENCOUNTER — NURSE TRIAGE (OUTPATIENT)
Dept: OTHER | Facility: OTHER | Age: 45
End: 2020-11-11

## 2021-12-08 ENCOUNTER — HOSPITAL ENCOUNTER (EMERGENCY)
Facility: HOSPITAL | Age: 46
Discharge: HOME | End: 2021-12-08
Attending: EMERGENCY MEDICINE

## 2021-12-08 VITALS
SYSTOLIC BLOOD PRESSURE: 112 MMHG | OXYGEN SATURATION: 97 % | HEART RATE: 74 BPM | RESPIRATION RATE: 16 BRPM | TEMPERATURE: 98.8 F | DIASTOLIC BLOOD PRESSURE: 63 MMHG

## 2021-12-08 DIAGNOSIS — J21.0 RSV (ACUTE BRONCHIOLITIS DUE TO RESPIRATORY SYNCYTIAL VIRUS): Primary | ICD-10-CM

## 2021-12-08 LAB
FLUAV RNA RESP QL NAA+PROBE: NEGATIVE
FLUBV RNA RESP QL NAA+PROBE: NEGATIVE
RSV RNA RESP QL NAA+PROBE: POSITIVE
SARS-COV-2 RNA RESP QL NAA+PROBE: NEGATIVE

## 2021-12-08 PROCEDURE — 99284 EMERGENCY DEPT VISIT MOD MDM: CPT | Performed by: EMERGENCY MEDICINE

## 2021-12-08 PROCEDURE — 99283 EMERGENCY DEPT VISIT LOW MDM: CPT

## 2021-12-08 PROCEDURE — 0241U HB NFCT DS VIR RESP RNA 4 TRGT: CPT

## 2021-12-08 RX ORDER — ONDANSETRON 4 MG/1
4 TABLET, ORALLY DISINTEGRATING ORAL ONCE
Status: COMPLETED | OUTPATIENT
Start: 2021-12-08 | End: 2021-12-08

## 2021-12-08 RX ADMIN — ONDANSETRON 4 MG: 4 TABLET, ORALLY DISINTEGRATING ORAL at 08:53

## 2021-12-23 ENCOUNTER — TELEPHONE (OUTPATIENT)
Dept: INTERNAL MEDICINE CLINIC | Facility: CLINIC | Age: 46
End: 2021-12-23

## 2022-01-08 ENCOUNTER — HOSPITAL ENCOUNTER (EMERGENCY)
Facility: HOSPITAL | Age: 47
Discharge: HOME | End: 2022-01-08
Attending: EMERGENCY MEDICINE

## 2022-01-08 VITALS
OXYGEN SATURATION: 97 % | HEART RATE: 85 BPM | RESPIRATION RATE: 18 BRPM | DIASTOLIC BLOOD PRESSURE: 74 MMHG | TEMPERATURE: 98.4 F | SYSTOLIC BLOOD PRESSURE: 129 MMHG

## 2022-01-08 DIAGNOSIS — B34.9 VIRAL SYNDROME: Primary | ICD-10-CM

## 2022-01-08 PROCEDURE — 99284 EMERGENCY DEPT VISIT MOD MDM: CPT | Performed by: EMERGENCY MEDICINE

## 2022-01-08 PROCEDURE — U0003 INFECTIOUS AGENT DETECTION BY NUCLEIC ACID (DNA OR RNA); SEVERE ACUTE RESPIRATORY SYNDROME CORONAVIRUS 2 (SARS-COV-2) (CORONAVIRUS DISEASE [COVID-19]), AMPLIFIED PROBE TECHNIQUE, MAKING USE OF HIGH THROUGHPUT TECHNOLOGIES AS DESCRIBED BY CMS-2020-01-R: HCPCS | Performed by: EMERGENCY MEDICINE

## 2022-01-08 PROCEDURE — 99283 EMERGENCY DEPT VISIT LOW MDM: CPT

## 2022-01-08 PROCEDURE — U0005 INFEC AGEN DETEC AMPLI PROBE: HCPCS | Performed by: EMERGENCY MEDICINE

## 2022-01-08 NOTE — ED PROVIDER NOTES
History  Chief Complaint   Patient presents with    Generalized Body Aches     patient coming from home for covid exposure seeking a covid test  He is  c/o headache, fever, cough and body aches  Denies any CP, SOB  Information recieved from patients wife  He is Danish speaking only  Patient is vaccinated        History provided by:  Patient   used: No    Generalized Body Aches  Associated symptoms: cough, fever, headaches and myalgias    Associated symptoms: no abdominal pain, no chest pain, no congestion, no diarrhea, no nausea, no rash, no shortness of breath, no sore throat, no vomiting and no wheezing      Patient is a 41-year-old male presenting to emergency department with headaches, fevers, chills, cough, body aches  Neck is supple  No rash  No chest pressures breath  Has had episode of vomiting  No abdominal pain  No diarrhea  No urine complaints  Vaccine for COVID  Family members with Alanis  Patient requesting to be swabbed for COVID    mdm will swab for COVID, outpatient follow-up      Prior to Admission Medications   Prescriptions Last Dose Informant Patient Reported? Taking? methocarbamol (ROBAXIN) 750 mg tablet   No No   Sig: Take 1 tablet (750 mg total) by mouth 2 (two) times a day as needed for muscle spasms for up to 10 days Do not drive with this medication      Facility-Administered Medications: None       No past medical history on file  Past Surgical History:   Procedure Laterality Date    CYSTOSCOPY      Diagnostic  Last assessed 7/31/2014        Family History   Problem Relation Age of Onset    Arthritis Mother     Stroke Father     Heart disease Father     No Known Problems Sister     No Known Problems Brother     No Known Problems Son     No Known Problems Daughter     No Known Problems Sister     No Known Problems Brother      I have reviewed and agree with the history as documented      E-Cigarette/Vaping    E-Cigarette Use Never User E-Cigarette/Vaping Substances    Nicotine No     THC No     CBD No      Social History     Tobacco Use    Smoking status: Never Smoker    Smokeless tobacco: Never Used   Vaping Use    Vaping Use: Never used   Substance Use Topics    Alcohol use: No    Drug use: Never       Review of Systems   Constitutional: Positive for chills and fever  Negative for diaphoresis  HENT: Negative for congestion and sore throat  Respiratory: Positive for cough  Negative for shortness of breath, wheezing and stridor  Cardiovascular: Negative for chest pain, palpitations and leg swelling  Gastrointestinal: Negative for abdominal pain, blood in stool, diarrhea, nausea and vomiting  Genitourinary: Negative for dysuria, frequency and urgency  Musculoskeletal: Positive for myalgias  Negative for neck pain and neck stiffness  Skin: Negative for pallor and rash  Neurological: Positive for headaches  Negative for dizziness, syncope, weakness and light-headedness  All other systems reviewed and are negative  Physical Exam  Physical Exam  Vitals reviewed  Constitutional:       Appearance: Normal appearance  He is well-developed  HENT:      Head: Normocephalic and atraumatic  Eyes:      Extraocular Movements: Extraocular movements intact  Pupils: Pupils are equal, round, and reactive to light  Cardiovascular:      Rate and Rhythm: Normal rate and regular rhythm  Heart sounds: Normal heart sounds  Pulmonary:      Effort: Pulmonary effort is normal  No respiratory distress  Breath sounds: Normal breath sounds  Abdominal:      General: Bowel sounds are normal       Palpations: Abdomen is soft  Tenderness: There is no abdominal tenderness  Musculoskeletal:         General: No swelling or tenderness  Normal range of motion  Cervical back: Normal range of motion and neck supple  Skin:     General: Skin is warm and dry        Capillary Refill: Capillary refill takes less than 2 seconds  Neurological:      General: No focal deficit present  Mental Status: He is alert and oriented to person, place, and time  Vital Signs  ED Triage Vitals [01/08/22 1404]   Temperature Pulse Respirations Blood Pressure SpO2   98 4 °F (36 9 °C) 85 18 129/74 97 %      Temp Source Heart Rate Source Patient Position - Orthostatic VS BP Location FiO2 (%)   Oral Monitor Sitting Left arm --      Pain Score       --           Vitals:    01/08/22 1404   BP: 129/74   Pulse: 85   Patient Position - Orthostatic VS: Sitting         Visual Acuity      ED Medications  Medications - No data to display    Diagnostic Studies  Results Reviewed     Procedure Component Value Units Date/Time    COVID only - 48 hour TAT [[id number]] Collected: 01/08/22 1412    Lab Status: In process Specimen: Nares from Nose Updated: 01/08/22 1425                 No orders to display              Procedures  Procedures         ED Course                               SBIRT 20yo+      Most Recent Value   SBIRT (23 yo +)    In order to provide better care to our patients, we are screening all of our patients for alcohol and drug use  Would it be okay to ask you these screening questions? No Filed at: 01/08/2022 1408                    MDM    Disposition  Final diagnoses:   Viral syndrome     Time reflects when diagnosis was documented in both MDM as applicable and the Disposition within this note     Time User Action Codes Description Comment    1/8/2022  2:11 PM Bonnie Young Add [B34 9] Viral syndrome       ED Disposition     ED Disposition Condition Date/Time Comment    Discharge Stable Sat Jan 8, 2022  2:11 PM [facility] discharge to home/self care  Follow-up Information     Follow up With Specialties Details Why Contact Info Additional Information    Dimitry Bahena PA-C Internal Medicine, Physician Assistant In 3 days re-evaluation [phone]   [address] [place] [address]  [address] [place] [address],[address]  [facility] [address] Emergency Department Emergency Medicine  As needed, If symptoms worsen [address] Emergency Department, [address], [place], [place], [address]          Discharge Medication List as of 1/8/2022  2:14 PM      CONTINUE these medications which have NOT CHANGED    Details   methocarbamol (ROBAXIN) 750 mg tablet Take 1 tablet (750 mg total) by mouth 2 (two) times a day as needed for muscle spasms for up to 10 days Do not drive with this medication, Starting u 5/2/2019, Until Sun 5/12/2019, Normal             No discharge procedures on file      PDMP Review     None          ED Provider  Electronically Signed by           Erlin Harrison MD  01/08/22 3192

## 2022-01-08 NOTE — Clinical Note
Fernandez Godoy was seen and treated in our emergency department on 1/8/2022  Diagnosis:     Mami Vilchis  may return to work on return date  He may return on this date:     Stay out of work till you have received COVID test results  Quarantine till test results are back      If you have any questions or concerns, please don't hesitate to call        Eboni Mueller MD    ______________________________           _______________          _______________  Hospital Representative                              Date                                Time

## 2022-01-08 NOTE — Clinical Note
[place] Neighbor was seen and treated in our emergency department on 1/8/2022  Diagnosis:     Juvencio Severino  may return to work on return date  He may return on this date:     Stay out of work till you have received COVID test results  Quarantine till test results are back      If you have any questions or concerns, please don't hesitate to call        Helena Baez MD    ______________________________           _______________          _______________  Hospital Representative                              Date                                Time

## 2022-01-10 LAB — SARS-COV-2 RNA RESP QL NAA+PROBE: POSITIVE

## 2022-01-10 NOTE — RESULT ENCOUNTER NOTE
Patient reviewed results in [facility] letter provided via [facility]

## 2022-01-24 ENCOUNTER — APPOINTMENT (EMERGENCY)
Dept: RADIOLOGY | Facility: HOSPITAL | Age: 47
End: 2022-01-24

## 2022-01-24 ENCOUNTER — HOSPITAL ENCOUNTER (EMERGENCY)
Facility: HOSPITAL | Age: 47
Discharge: HOME | End: 2022-01-25
Attending: EMERGENCY MEDICINE | Admitting: EMERGENCY MEDICINE

## 2022-01-24 ENCOUNTER — APPOINTMENT (EMERGENCY)
Dept: CT IMAGING | Facility: HOSPITAL | Age: 47
End: 2022-01-24

## 2022-01-24 VITALS
RESPIRATION RATE: 17 BRPM | SYSTOLIC BLOOD PRESSURE: 111 MMHG | DIASTOLIC BLOOD PRESSURE: 72 MMHG | HEART RATE: 100 BPM | TEMPERATURE: 98.3 F | OXYGEN SATURATION: 99 %

## 2022-01-24 DIAGNOSIS — R19.7 NAUSEA VOMITING AND DIARRHEA: ICD-10-CM

## 2022-01-24 DIAGNOSIS — D72.829 LEUKOCYTOSIS: ICD-10-CM

## 2022-01-24 DIAGNOSIS — R11.2 NAUSEA VOMITING AND DIARRHEA: ICD-10-CM

## 2022-01-24 DIAGNOSIS — K52.9 ENTERITIS: Primary | ICD-10-CM

## 2022-01-24 LAB
ALBUMIN SERPL BCP-MCNC: 4 G/DL (ref 3.5–5)
ALP SERPL-CCNC: 132 U/L (ref 46–116)
ALT SERPL W P-5'-P-CCNC: 56 U/L (ref 12–78)
ANION GAP SERPL CALCULATED.3IONS-SCNC: 7 MMOL/L (ref 4–13)
APTT PPP: 29 SECONDS (ref 23–37)
AST SERPL W P-5'-P-CCNC: 25 U/L (ref 5–45)
BASOPHILS # BLD AUTO: 0.05 THOUSANDS/ΜL (ref 0–0.1)
BASOPHILS NFR BLD AUTO: 0 % (ref 0–1)
BILIRUB SERPL-MCNC: 0.47 MG/DL (ref 0.2–1)
BILIRUB UR QL STRIP: NEGATIVE
BUN SERPL-MCNC: 18 MG/DL (ref 5–25)
CALCIUM SERPL-MCNC: 8.9 MG/DL (ref 8.3–10.1)
CARDIAC TROPONIN I PNL SERPL HS: <2 NG/L
CHLORIDE SERPL-SCNC: 105 MMOL/L (ref 100–108)
CK SERPL-CCNC: 157 U/L (ref 39–308)
CLARITY UR: CLEAR
CO2 SERPL-SCNC: 29 MMOL/L (ref 21–32)
COLOR UR: YELLOW
CREAT SERPL-MCNC: 0.86 MG/DL (ref 0.6–1.3)
EOSINOPHIL # BLD AUTO: 0.06 THOUSAND/ΜL (ref 0–0.61)
EOSINOPHIL NFR BLD AUTO: 0 % (ref 0–6)
ERYTHROCYTE [DISTWIDTH] IN BLOOD BY AUTOMATED COUNT: 13.2 % (ref 11.6–15.1)
GFR SERPL CREATININE-BSD FRML MDRD: 102 ML/MIN/1.73SQ M
GLUCOSE SERPL-MCNC: 100 MG/DL (ref 65–140)
GLUCOSE UR STRIP-MCNC: NEGATIVE MG/DL
HCT VFR BLD AUTO: 51.5 % (ref 36.5–49.3)
HGB BLD-MCNC: 16.7 G/DL (ref 12–17)
HGB UR QL STRIP.AUTO: NEGATIVE
IMM GRANULOCYTES # BLD AUTO: 0.08 THOUSAND/UL (ref 0–0.2)
IMM GRANULOCYTES NFR BLD AUTO: 1 % (ref 0–2)
INR PPP: 0.94 (ref 0.84–1.19)
KETONES UR STRIP-MCNC: ABNORMAL MG/DL
LACTATE SERPL-SCNC: 1.9 MMOL/L (ref 0.5–2)
LEUKOCYTE ESTERASE UR QL STRIP: NEGATIVE
LIPASE SERPL-CCNC: 77 U/L (ref 73–393)
LYMPHOCYTES # BLD AUTO: 1.06 THOUSANDS/ΜL (ref 0.6–4.47)
LYMPHOCYTES NFR BLD AUTO: 7 % (ref 14–44)
MAGNESIUM SERPL-MCNC: 1.5 MG/DL (ref 1.6–2.6)
MCH RBC QN AUTO: 28.4 PG (ref 26.8–34.3)
MCHC RBC AUTO-ENTMCNC: 32.4 G/DL (ref 31.4–37.4)
MCV RBC AUTO: 88 FL (ref 82–98)
MONOCYTES # BLD AUTO: 0.96 THOUSAND/ΜL (ref 0.17–1.22)
MONOCYTES NFR BLD AUTO: 7 % (ref 4–12)
NEUTROPHILS # BLD AUTO: 12.58 THOUSANDS/ΜL (ref 1.85–7.62)
NEUTS SEG NFR BLD AUTO: 85 % (ref 43–75)
NITRITE UR QL STRIP: NEGATIVE
NRBC BLD AUTO-RTO: 0 /100 WBCS
PH UR STRIP.AUTO: 7 [PH]
PLATELET # BLD AUTO: 274 THOUSANDS/UL (ref 149–390)
PMV BLD AUTO: 11.4 FL (ref 8.9–12.7)
POTASSIUM SERPL-SCNC: 3.9 MMOL/L (ref 3.5–5.3)
PROT SERPL-MCNC: 7.7 G/DL (ref 6.4–8.2)
PROT UR STRIP-MCNC: NEGATIVE MG/DL
PROTHROMBIN TIME: 12.6 SECONDS (ref 11.6–14.5)
RBC # BLD AUTO: 5.87 MILLION/UL (ref 3.88–5.62)
S PYO DNA THROAT QL NAA+PROBE: NORMAL
SODIUM SERPL-SCNC: 141 MMOL/L (ref 136–145)
SP GR UR STRIP.AUTO: 1.01 (ref 1–1.03)
UROBILINOGEN UR QL STRIP.AUTO: 0.2 E.U./DL
WBC # BLD AUTO: 14.79 THOUSAND/UL (ref 4.31–10.16)

## 2022-01-24 PROCEDURE — 87651 STREP A DNA AMP PROBE: CPT | Performed by: PHYSICIAN ASSISTANT

## 2022-01-24 PROCEDURE — 85025 COMPLETE CBC W/AUTO DIFF WBC: CPT | Performed by: PHYSICIAN ASSISTANT

## 2022-01-24 PROCEDURE — 83605 ASSAY OF LACTIC ACID: CPT | Performed by: PHYSICIAN ASSISTANT

## 2022-01-24 PROCEDURE — 71045 X-RAY EXAM CHEST 1 VIEW: CPT

## 2022-01-24 PROCEDURE — 85730 THROMBOPLASTIN TIME PARTIAL: CPT | Performed by: PHYSICIAN ASSISTANT

## 2022-01-24 PROCEDURE — 99285 EMERGENCY DEPT VISIT HI MDM: CPT | Performed by: PHYSICIAN ASSISTANT

## 2022-01-24 PROCEDURE — 83690 ASSAY OF LIPASE: CPT | Performed by: PHYSICIAN ASSISTANT

## 2022-01-24 PROCEDURE — 80053 COMPREHEN METABOLIC PANEL: CPT | Performed by: PHYSICIAN ASSISTANT

## 2022-01-24 PROCEDURE — 83735 ASSAY OF MAGNESIUM: CPT | Performed by: PHYSICIAN ASSISTANT

## 2022-01-24 PROCEDURE — 87636 SARSCOV2 & INF A&B AMP PRB: CPT | Performed by: PHYSICIAN ASSISTANT

## 2022-01-24 PROCEDURE — 99284 EMERGENCY DEPT VISIT MOD MDM: CPT

## 2022-01-24 PROCEDURE — 85610 PROTHROMBIN TIME: CPT | Performed by: PHYSICIAN ASSISTANT

## 2022-01-24 PROCEDURE — 84484 ASSAY OF TROPONIN QUANT: CPT | Performed by: PHYSICIAN ASSISTANT

## 2022-01-24 PROCEDURE — G1004 CDSM NDSC: HCPCS

## 2022-01-24 PROCEDURE — 96361 HYDRATE IV INFUSION ADD-ON: CPT

## 2022-01-24 PROCEDURE — 93005 ELECTROCARDIOGRAM TRACING: CPT

## 2022-01-24 PROCEDURE — 74177 CT ABD & PELVIS W/CONTRAST: CPT

## 2022-01-24 PROCEDURE — 36415 COLL VENOUS BLD VENIPUNCTURE: CPT | Performed by: PHYSICIAN ASSISTANT

## 2022-01-24 PROCEDURE — 81003 URINALYSIS AUTO W/O SCOPE: CPT | Performed by: PHYSICIAN ASSISTANT

## 2022-01-24 PROCEDURE — 82550 ASSAY OF CK (CPK): CPT | Performed by: PHYSICIAN ASSISTANT

## 2022-01-24 PROCEDURE — 96374 THER/PROPH/DIAG INJ IV PUSH: CPT

## 2022-01-24 RX ORDER — CIPROFLOXACIN 500 MG/1
500 TABLET, FILM COATED ORAL ONCE
Status: COMPLETED | OUTPATIENT
Start: 2022-01-24 | End: 2022-01-25

## 2022-01-24 RX ORDER — FAMOTIDINE 20 MG/1
20 TABLET, FILM COATED ORAL ONCE
Status: COMPLETED | OUTPATIENT
Start: 2022-01-24 | End: 2022-01-24

## 2022-01-24 RX ORDER — KETOROLAC TROMETHAMINE 30 MG/ML
15 INJECTION, SOLUTION INTRAMUSCULAR; INTRAVENOUS ONCE
Status: COMPLETED | OUTPATIENT
Start: 2022-01-24 | End: 2022-01-24

## 2022-01-24 RX ORDER — METRONIDAZOLE 500 MG/1
500 TABLET ORAL ONCE
Status: COMPLETED | OUTPATIENT
Start: 2022-01-24 | End: 2022-01-25

## 2022-01-24 RX ADMIN — SODIUM CHLORIDE 1000 ML: 0.9 INJECTION, SOLUTION INTRAVENOUS at 21:58

## 2022-01-24 RX ADMIN — KETOROLAC TROMETHAMINE 15 MG: 30 INJECTION, SOLUTION INTRAMUSCULAR at 21:58

## 2022-01-24 RX ADMIN — IOHEXOL 100 ML: 350 INJECTION, SOLUTION INTRAVENOUS at 22:00

## 2022-01-24 RX ADMIN — FAMOTIDINE 20 MG: 20 TABLET, FILM COATED ORAL at 21:58

## 2022-01-24 NOTE — Clinical Note
Marian Dewayne was seen and treated in our emergency department on 1/24/2022  Diagnosis:     Flor Felicia    He may return on this date: 01/27/2022         If you have any questions or concerns, please don't hesitate to call        Shayna Robin PA-C    ______________________________           _______________          _______________  Hospital Representative                              Date                                Time

## 2022-01-25 LAB
ATRIAL RATE: 96 BPM
CARDIAC TROPONIN I PNL SERPL HS: <2 NG/L
FLUAV RNA RESP QL NAA+PROBE: NEGATIVE
FLUBV RNA RESP QL NAA+PROBE: NEGATIVE
P AXIS: 51 DEGREES
PR INTERVAL: 166 MS
QRS AXIS: 69 DEGREES
QRSD INTERVAL: 86 MS
QT INTERVAL: 328 MS
QTC INTERVAL: 414 MS
SARS-COV-2 RNA RESP QL NAA+PROBE: POSITIVE
T WAVE AXIS: 33 DEGREES
VENTRICULAR RATE: 96 BPM

## 2022-01-25 PROCEDURE — 93010 ELECTROCARDIOGRAM REPORT: CPT | Performed by: INTERNAL MEDICINE

## 2022-01-25 RX ORDER — ONDANSETRON 4 MG/1
4 TABLET, FILM COATED ORAL EVERY 6 HOURS
Qty: 12 TABLET | Refills: 0 | Status: SHIPPED | OUTPATIENT
Start: 2022-01-25 | End: 2022-01-28

## 2022-01-25 RX ORDER — METRONIDAZOLE 500 MG/1
500 TABLET ORAL EVERY 8 HOURS SCHEDULED
Qty: 30 TABLET | Refills: 0 | Status: SHIPPED | OUTPATIENT
Start: 2022-01-25 | End: 2022-02-04

## 2022-01-25 RX ORDER — CIPROFLOXACIN 500 MG/1
500 TABLET, FILM COATED ORAL 2 TIMES DAILY
Qty: 20 TABLET | Refills: 0 | Status: SHIPPED | OUTPATIENT
Start: 2022-01-25 | End: 2022-02-04

## 2022-01-25 RX ORDER — SENNOSIDES 8.6 MG
650 CAPSULE ORAL EVERY 8 HOURS PRN
Qty: 9 TABLET | Refills: 0 | Status: SHIPPED | OUTPATIENT
Start: 2022-01-25 | End: 2022-01-28

## 2022-01-25 RX ADMIN — MAGNESIUM OXIDE TAB 400 MG (241.3 MG ELEMENTAL MG) 400 MG: 400 (241.3 MG) TAB at 00:17

## 2022-01-25 RX ADMIN — METRONIDAZOLE 500 MG: 500 TABLET ORAL at 00:17

## 2022-01-25 RX ADMIN — CIPROFLOXACIN 500 MG: 500 TABLET, COATED ORAL at 00:17

## 2022-01-25 NOTE — DISCHARGE INSTRUCTIONS
Yanira Daniel MD  [phone] 1/24/2022      Narrative & Impression  CT ABDOMEN AND PELVIS WITH IV CONTRAST     INDICATION:   Nausea, vomiting and abdominal pain  COMPARISON:  2/13/2017  TECHNIQUE:  CT examination of the abdomen and pelvis was performed  Axial, sagittal, and coronal 2D reformatted images were created from the source data and submitted for interpretation  Radiation dose length product (DLP) for this visit:  356 mGy-cm   This examination, like all CT scans performed in the [facility], was performed utilizing techniques to minimize radiation dose exposure, including the use of iterative   reconstruction and automated exposure control  IV Contrast:  100 mL of iohexol (OMNIPAQUE)  Enteric Contrast:  Enteric contrast was not administered  FINDINGS:     ABDOMEN     LOWER CHEST:  Clear lung bases  LIVER/BILIARY TREE:  Unremarkable  GALLBLADDER:  No calcified gallstones  No pericholecystic inflammatory change  SPLEEN:  Unremarkable  PANCREAS:  Unremarkable  ADRENAL GLANDS:  Unremarkable  KIDNEYS/URETERS:  Symmetric nephrographic phase enhancement  No obstructive uropathy  Figueroa Silvano STOMACH AND BOWEL:  Moderate gastric distention  Fluid-filled mildly distended loops of small bowel without evidence of obstruction  Liquid stool in the right colon  No evidence of colitis or diverticulitis  APPENDIX:  No findings to suggest appendicitis  ABDOMINOPELVIC CAVITY:  No ascites  No pneumoperitoneum  No lymphadenopathy  VESSELS:  Patent portal, splenic and mesenteric veins  PELVIS     REPRODUCTIVE ORGANS:  No prostate enlargement  URINARY BLADDER:  Unremarkable  ABDOMINAL WALL/INGUINAL REGIONS:  Unremarkable  OSSEOUS STRUCTURES:  No acute fracture or destructive osseous lesion  IMPRESSION:     Findings consistent with inflammatory or infectious enteritis       Workstation performed: [id number]    Takes ciprofloxacin, Flagyl, Tylenol, and Zofran as indicated  Follow-up with PCP  Follow-up with GI as needed  Follow-up with emergency department symptoms persist exacerbate
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
## 2022-01-28 ENCOUNTER — HOSPITAL ENCOUNTER (EMERGENCY)
Facility: HOSPITAL | Age: 47
Discharge: HOME | End: 2022-01-28
Attending: EMERGENCY MEDICINE

## 2022-01-28 VITALS
HEIGHT: 68 IN | SYSTOLIC BLOOD PRESSURE: 120 MMHG | BODY MASS INDEX: 24.06 KG/M2 | TEMPERATURE: 97.7 F | WEIGHT: 158.73 LBS | RESPIRATION RATE: 18 BRPM | DIASTOLIC BLOOD PRESSURE: 85 MMHG | HEART RATE: 85 BPM | OXYGEN SATURATION: 97 %

## 2022-01-28 DIAGNOSIS — R05.9 COUGH: Primary | ICD-10-CM

## 2022-01-28 DIAGNOSIS — U07.1 COVID-19: ICD-10-CM

## 2022-01-28 PROCEDURE — 99282 EMERGENCY DEPT VISIT SF MDM: CPT | Performed by: EMERGENCY MEDICINE

## 2022-01-28 PROCEDURE — 99283 EMERGENCY DEPT VISIT LOW MDM: CPT

## 2022-01-28 NOTE — DISCHARGE INSTRUCTIONS
Per CDC guidelines: If You Test Positive for COVID-19 (Isolate)  Everyone, regardless of vaccination status  Stay home for 5 days  If you have no symptoms or your symptoms are resolving after 5 days, you can leave your house  Continue to wear a mask around others for 5 additional days  If you have a fever, continue to stay home until your fever resolves  If you have no fever for proceeding 24 hours and your symptoms are improving  Please continue to wear mask for 5 days

## 2022-01-28 NOTE — Clinical Note
Ulysses Epps was seen and treated in our emergency department on 1/28/2022  Diagnosis:     Adriana Norwood  may return to work on return date  He may return on this date: 01/29/2022    If you have no fever for proceeding 24 hours and your symptoms are improving  Please continue to wear mask for 5 days  If you have any questions or concerns, please don't hesitate to call        Reed Fuentes MD    ______________________________           _______________          _______________  Hospital Representative                              Date                                Time

## 2022-01-28 NOTE — ED PROVIDER NOTES
History  Chief Complaint   Patient presents with    Cough     Pt wants to be tested for covid  He wants to know if he can return back to work  pt c/o water drainage from mouth, sore throat, and cough  Pt has been tested mutliple times this month for covid and was positive  HPI     38-year-old male presents emergency for evaluation  He wants a COVID test to return work  He tested positive 1/24  He reports no fever  Reports his symptoms are about the same if improving  He denies any shortness of breath, chest pain, new or concerning symptoms  Prior to Admission Medications   Prescriptions Last Dose Informant Patient Reported? Taking?   acetaminophen (TYLENOL) 650 mg CR tablet   No No   Sig: Take 1 tablet (650 mg total) by mouth every 8 (eight) hours as needed for mild pain for up to 3 days   ciprofloxacin (CIPRO) 500 mg tablet   No No   Sig: Take 1 tablet (500 mg total) by mouth 2 (two) times a day for 10 days   methocarbamol (ROBAXIN) 750 mg tablet   No No   Sig: Take 1 tablet (750 mg total) by mouth 2 (two) times a day as needed for muscle spasms for up to 10 days Do not drive with this medication   metroNIDAZOLE (FLAGYL) 500 mg tablet   No No   Sig: Take 1 tablet (500 mg total) by mouth every 8 (eight) hours for 10 days   ondansetron (ZOFRAN) 4 mg tablet   No No   Sig: Take 1 tablet (4 mg total) by mouth every 6 (six) hours for 3 days      Facility-Administered Medications: None       History reviewed  No pertinent past medical history  Past Surgical History:   Procedure Laterality Date    CYSTOSCOPY      Diagnostic   Last assessed 7/31/2014        Family History   Problem Relation Age of Onset    Arthritis Mother     Stroke Father     Heart disease Father     No Known Problems Sister     No Known Problems Brother     No Known Problems Son     No Known Problems Daughter     No Known Problems Sister     No Known Problems Brother      I have reviewed and agree with the history as documented  E-Cigarette/Vaping    E-Cigarette Use Never User      E-Cigarette/Vaping Substances    Nicotine No     THC No     CBD No      Social History     Tobacco Use    Smoking status: Never Smoker    Smokeless tobacco: Never Used   Vaping Use    Vaping Use: Never used   Substance Use Topics    Alcohol use: No    Drug use: Never       Review of Systems   HENT: Positive for congestion  Respiratory: Positive for cough  All other systems reviewed and are negative  Physical Exam  Physical Exam  Vitals and nursing note reviewed  Constitutional:       General: He is not in acute distress  Appearance: He is well-developed  He is not diaphoretic  HENT:      Head: Normocephalic and atraumatic  Right Ear: External ear normal       Left Ear: External ear normal    Eyes:      General:         Right eye: No discharge  Left eye: No discharge  Pupils: Pupils are equal, round, and reactive to light  Neck:      Thyroid: No thyromegaly  Trachea: No tracheal deviation  Cardiovascular:      Rate and Rhythm: Normal rate and regular rhythm  Heart sounds: No murmur heard  Pulmonary:      Effort: Pulmonary effort is normal       Breath sounds: Normal breath sounds  Abdominal:      General: Bowel sounds are normal  There is no distension  Palpations: Abdomen is soft  Tenderness: There is no abdominal tenderness  Musculoskeletal:         General: No deformity  Normal range of motion  Cervical back: Normal range of motion and neck supple  Skin:     General: Skin is warm  Capillary Refill: Capillary refill takes less than 2 seconds  Neurological:      Mental Status: He is alert and oriented to person, place, and time  Cranial Nerves: No cranial nerve deficit  Motor: No abnormal muscle tone     Psychiatric:         Behavior: Behavior normal          Vital Signs  ED Triage Vitals   Temperature Pulse Respirations Blood Pressure SpO2 01/28/22 1309 01/28/22 1255 01/28/22 1255 01/28/22 1255 01/28/22 1255   97 7 °F (36 5 °C) 85 18 120/85 97 %      Temp Source Heart Rate Source Patient Position - Orthostatic VS BP Location FiO2 (%)   01/28/22 1309 01/28/22 1255 01/28/22 1255 01/28/22 1255 --   Oral Monitor Sitting Left arm       Pain Score       --                  Vitals:    01/28/22 1255   BP: 120/85   Pulse: 85   Patient Position - Orthostatic VS: Sitting         Visual Acuity      ED Medications  Medications - No data to display    Diagnostic Studies  Results Reviewed     None                 No orders to display              Procedures  Procedures         ED Course                                             MDM  Number of Diagnoses or Management Options  Cough: established and improving  COVID-19: established and improving  Diagnosis management comments: Vital signs unremarkable  Patient states his symptoms are about the same not improving  No fever  Positive COVID test on 01/24  No indication for repeat emergent testing for COVID  Used   Reviewed CDC guidelines which require patient to stay at home for 5 days from positive test, symptoms are resolving after 5 days, we can leave house with mask for the next 5 days  Patient ambulatory, no acute distress at time of discharge         Amount and/or Complexity of Data Reviewed  Clinical lab tests: reviewed    Risk of Complications, Morbidity, and/or Mortality  Presenting problems: moderate  Diagnostic procedures: low  Management options: moderate    Patient Progress  Patient progress: stable      Disposition  Final diagnoses:   Cough   COVID-19     Time reflects when diagnosis was documented in both MDM as applicable and the Disposition within this note     Time User Action Codes Description Comment    1/28/2022  1:16 PM Lanelle Sniff Add [R05 9] Cough     1/28/2022  1:17 PM Lanelle Sniff Add [U07 1] COVID-19       ED Disposition     ED Disposition Condition Date/Time Comment Discharge Stable Fri Jan 28, 2022  1:16 PM Augusta Magdiel discharge to home/self care  Follow-up Information     Follow up With Specialties Details Why Contact Info Additional Information    Deronda Boas, PA-C Internal Medicine, Physician Assistant Go to  As needed [phone] e [address] Emergency Department Emergency Medicine  If symptoms worsen [address] Emergency Department, [address], [place], [place], [address]          Discharge Medication List as of 1/28/2022  1:17 PM      CONTINUE these medications which have NOT CHANGED    Details   acetaminophen (TYLENOL) 650 mg CR tablet Take 1 tablet (650 mg total) by mouth every 8 (eight) hours as needed for mild pain for up to 3 days, Starting Tue 1/25/2022, Until Fri 1/28/2022 at 2359, Normal      ciprofloxacin (CIPRO) 500 mg tablet Take 1 tablet (500 mg total) by mouth 2 (two) times a day for 10 days, Starting Tue 1/25/2022, Until Fri 2/4/2022, Normal      methocarbamol (ROBAXIN) 750 mg tablet Take 1 tablet (750 mg total) by mouth 2 (two) times a day as needed for muscle spasms for up to 10 days Do not drive with this medication, Starting Thu 5/2/2019, Until Sun 5/12/2019, Normal      metroNIDAZOLE (FLAGYL) 500 mg tablet Take 1 tablet (500 mg total) by mouth every 8 (eight) hours for 10 days, Starting Tue 1/25/2022, Until Fri 2/4/2022, Normal      ondansetron (ZOFRAN) 4 mg tablet Take 1 tablet (4 mg total) by mouth every 6 (six) hours for 3 days, Starting Tue 1/25/2022, Until Fri 1/28/2022, Normal             No discharge procedures on file      PDMP Review     None          ED Provider  Electronically Signed by           Kalyn De Jesus MD  01/28/22 4760

## 2022-04-12 ENCOUNTER — APPOINTMENT (EMERGENCY)
Dept: RADIOLOGY | Facility: HOSPITAL | Age: 47
End: 2022-04-12

## 2022-04-12 ENCOUNTER — HOSPITAL ENCOUNTER (EMERGENCY)
Facility: HOSPITAL | Age: 47
Discharge: HOME | End: 2022-04-12
Attending: EMERGENCY MEDICINE | Admitting: EMERGENCY MEDICINE

## 2022-04-12 VITALS
TEMPERATURE: 98 F | OXYGEN SATURATION: 96 % | BODY MASS INDEX: 24.23 KG/M2 | SYSTOLIC BLOOD PRESSURE: 126 MMHG | DIASTOLIC BLOOD PRESSURE: 81 MMHG | WEIGHT: 158 LBS | RESPIRATION RATE: 18 BRPM | HEART RATE: 74 BPM

## 2022-04-12 DIAGNOSIS — M54.41 ACUTE BILATERAL LOW BACK PAIN WITH BILATERAL SCIATICA: Primary | ICD-10-CM

## 2022-04-12 DIAGNOSIS — M54.42 ACUTE BILATERAL LOW BACK PAIN WITH BILATERAL SCIATICA: Primary | ICD-10-CM

## 2022-04-12 PROCEDURE — 99284 EMERGENCY DEPT VISIT MOD MDM: CPT | Performed by: PHYSICIAN ASSISTANT

## 2022-04-12 PROCEDURE — 96372 THER/PROPH/DIAG INJ SC/IM: CPT

## 2022-04-12 PROCEDURE — 72100 X-RAY EXAM L-S SPINE 2/3 VWS: CPT

## 2022-04-12 PROCEDURE — 99283 EMERGENCY DEPT VISIT LOW MDM: CPT

## 2022-04-12 RX ORDER — CYCLOBENZAPRINE HCL 10 MG
10 TABLET ORAL ONCE
Status: COMPLETED | OUTPATIENT
Start: 2022-04-12 | End: 2022-04-12

## 2022-04-12 RX ORDER — CYCLOBENZAPRINE HCL 5 MG
TABLET ORAL
Qty: 12 TABLET | Refills: 0 | Status: SHIPPED | OUTPATIENT
Start: 2022-04-12

## 2022-04-12 RX ORDER — KETOROLAC TROMETHAMINE 30 MG/ML
30 INJECTION, SOLUTION INTRAMUSCULAR; INTRAVENOUS ONCE
Status: COMPLETED | OUTPATIENT
Start: 2022-04-12 | End: 2022-04-12

## 2022-04-12 RX ORDER — PREDNISONE 20 MG/1
TABLET ORAL
Qty: 11 TABLET | Refills: 0 | Status: SHIPPED | OUTPATIENT
Start: 2022-04-12

## 2022-04-12 RX ADMIN — CYCLOBENZAPRINE HYDROCHLORIDE 10 MG: 10 TABLET, FILM COATED ORAL at 12:13

## 2022-04-12 RX ADMIN — KETOROLAC TROMETHAMINE 30 MG: 30 INJECTION, SOLUTION INTRAMUSCULAR at 12:13

## 2022-04-12 NOTE — ED PROVIDER NOTES
History  Chief Complaint   Patient presents with    Back Pain     back pain starting yesterday that radiates down both legs     40-year-old male presents to the emergency department with complaints of lower back pain  States that he has had some intermittent back pain over the past 2 weeks which seems to have gotten worse since yesterday  Described a sharp pain in his lower back with radiation down the outside of both of his legs to his knees  He denies fevers, chills, nausea, vomiting, or incontinence  No previous immediate pain when lifting  Has been taking naproxen intermittently at home without alleviation in symptoms  History provided by:  Patient   used: No    Back Pain  Location:  Lumbar spine  Quality:  Aching and stabbing  Radiates to: upper/outter legs bilaterally  Pain severity:  Moderate  Pain is:  Unable to specify  Onset quality:  Gradual  Duration:  1 day  Timing:  Constant  Progression:  Waxing and waning  Chronicity:  New  Relieved by:  Being still  Worsened by: Movement  Ineffective treatments:  NSAIDs  Associated symptoms: no abdominal pain, no abdominal swelling, no bladder incontinence, no bowel incontinence, no chest pain, no dysuria, no fever, no headaches, no leg pain, no numbness, no paresthesias, no pelvic pain, no perianal numbness, no tingling, no weakness and no weight loss        Prior to Admission Medications   Prescriptions Last Dose Informant Patient Reported? Taking? methocarbamol (ROBAXIN) 750 mg tablet   No No   Sig: Take 1 tablet (750 mg total) by mouth 2 (two) times a day as needed for muscle spasms for up to 10 days Do not drive with this medication   ondansetron (ZOFRAN) 4 mg tablet   No No   Sig: Take 1 tablet (4 mg total) by mouth every 6 (six) hours for 3 days      Facility-Administered Medications: None       History reviewed  No pertinent past medical history      Past Surgical History:   Procedure Laterality Date    CYSTOSCOPY Diagnostic  Last assessed 7/31/2014        Family History   Problem Relation Age of Onset    Arthritis Mother     Stroke Father     Heart disease Father     No Known Problems Sister     No Known Problems Brother     No Known Problems Son     No Known Problems Daughter     No Known Problems Sister     No Known Problems Brother      I have reviewed and agree with the history as documented  E-Cigarette/Vaping    E-Cigarette Use Never User      E-Cigarette/Vaping Substances    Nicotine No     THC No     CBD No      Social History     Tobacco Use    Smoking status: Never Smoker    Smokeless tobacco: Never Used   Vaping Use    Vaping Use: Never used   Substance Use Topics    Alcohol use: No    Drug use: Never       Review of Systems   Constitutional: Negative for activity change, fever and weight loss  Cardiovascular: Negative for chest pain  Gastrointestinal: Negative for abdominal pain, bowel incontinence, nausea and vomiting  Genitourinary: Negative for bladder incontinence, decreased urine volume (no urinary incontinence ), dysuria, flank pain and pelvic pain  Musculoskeletal: Positive for back pain  Skin: Negative for rash  Neurological: Negative for tingling, weakness, numbness, headaches and paresthesias  Physical Exam  Physical Exam  Vitals and nursing note reviewed  Constitutional:       Appearance: He is well-developed  HENT:      Head: Normocephalic and atraumatic  Cardiovascular:      Rate and Rhythm: Normal rate and regular rhythm  Heart sounds: Normal heart sounds  No murmur heard  No friction rub  No gallop  Pulmonary:      Effort: Pulmonary effort is normal       Breath sounds: Normal breath sounds  Musculoskeletal:         General: No deformity  Lumbar back: Tenderness present  No swelling, edema, deformity, spasms or bony tenderness  Decreased range of motion  No scoliosis          Back:    Neurological:      Mental Status: He is alert and oriented to person, place, and time  GCS: GCS eye subscore is 4  GCS verbal subscore is 5  GCS motor subscore is 6  Sensory: Sensation is intact  Motor: No weakness  Deep Tendon Reflexes: Reflexes are normal and symmetric  Reflex Scores:       Patellar reflexes are 2+ on the right side and 2+ on the left side  Psychiatric:         Mood and Affect: Mood normal          Behavior: Behavior normal          Vital Signs  ED Triage Vitals [04/12/22 1129]   Temperature Pulse Respirations Blood Pressure SpO2   98 °F (36 7 °C) 74 18 126/81 96 %      Temp Source Heart Rate Source Patient Position - Orthostatic VS BP Location FiO2 (%)   Tympanic -- -- -- --      Pain Score       8           Vitals:    04/12/22 1129   BP: 126/81   Pulse: 74         Visual Acuity      ED Medications  Medications   ketorolac (TORADOL) injection 30 mg (30 mg Intramuscular Given 4/12/22 1213)   cyclobenzaprine (FLEXERIL) tablet 10 mg (10 mg Oral Given 4/12/22 1213)       Diagnostic Studies  Results Reviewed     None                 XR lumbar spine 2 or 3 views   ED Interpretation by Rubin Lopes PA-C (04/12 1238)   Normal xray of the lumber spine        Final Result by Claudene Durham, MD (04/12 1557)      No acute osseous abnormality  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  Unable to answer at this time Filed at: 04/12/2022 1145                    MDM  Number of Diagnoses or Management Options  Acute bilateral low back pain with bilateral sciatica  Diagnosis management comments: Differential diagnosis includes but not limited to:  Lumbar sprain, sciatica, disc herniation, compression fracture         Amount and/or Complexity of Data Reviewed  Tests in the radiology section of CPT®: reviewed and ordered  Independent visualization of images, tracings, or specimens: yes        Disposition  Final diagnoses:   Acute bilateral low back pain with bilateral sciatica     Time reflects when diagnosis was documented in both MDM as applicable and the Disposition within this note     Time User Action Codes Description Comment    4/12/2022 12:40 PM Bel [place] 26 [M54 42,  M54 41] Acute bilateral low back pain with bilateral sciatica       ED Disposition     ED Disposition Condition Date/Time Comment    Discharge Stable Tue Apr 12, 2022 12:40 PM [facility] discharge to home/self care              Follow-up Information    None         Discharge Medication List as of 4/12/2022 12:53 PM      START taking these medications    Details   cyclobenzaprine (FLEXERIL) 5 mg tablet 1-2 PO TID PRN, Normal      predniSONE 20 mg tablet 3 tb po on day 1; 2 tb po on days 2-5, Normal         CONTINUE these medications which have NOT CHANGED    Details   methocarbamol (ROBAXIN) 750 mg tablet Take 1 tablet (750 mg total) by mouth 2 (two) times a day as needed for muscle spasms for up to 10 days Do not drive with this medication, Starting u 5/2/2019, Until Sun 5/12/2019, Normal      ondansetron (ZOFRAN) 4 mg tablet Take 1 tablet (4 mg total) by mouth every 6 (six) hours for 3 days, Starting Tue 1/25/2022, Until Fri 1/28/2022, Normal                 PDMP Review     None          ED Provider  Electronically Signed by           Familia Warner PA-C  04/12/22 6386

## 2022-04-13 ENCOUNTER — TELEPHONE (OUTPATIENT)
Dept: PHYSICAL THERAPY | Facility: OTHER | Age: 47
End: 2022-04-13

## 2022-04-13 NOTE — TELEPHONE ENCOUNTER
Call placed to the patient per [facility] referral     Voice message left for patient to call back  Phone number and hours of business provided  This is the 1st attempt to reach the patient  Will defer per protocol  H I???  None listed

## 2022-04-18 ENCOUNTER — NURSE TRIAGE (OUTPATIENT)
Dept: PHYSICAL THERAPY | Facility: OTHER | Age: 47
End: 2022-04-18

## 2022-04-18 NOTE — TELEPHONE ENCOUNTER
Message left for patient regarding referral entered for  [facility]    Contact information, hours of operation and VM instructions left  Nurse requested patient to CB if needed and leave Full Name &  on VM       Referral deferred for f/u

## 2022-04-22 NOTE — TELEPHONE ENCOUNTER
Message left for patient regarding referral entered for  [facility]    Contact information, hours of operation and VM instructions left  Nurse requested patient to CB if needed and leave Full Name &  on VM  Referral closed per protocol    Note: nurse called pt to discuss referral where pt stated he did not speak [place]  Nurse offered to [facility] w/ [facility]  Patient agreed  Second call made immediately after 1st with assistance of [address] [place] [address] # [phone]  Patient did not answer and message left

## 2022-06-17 ENCOUNTER — TELEPHONE (OUTPATIENT)
Dept: INTERNAL MEDICINE CLINIC | Facility: CLINIC | Age: 47
End: 2022-06-17

## 2022-06-17 NOTE — TELEPHONE ENCOUNTER
Wife of pt called, Pt is having chest pain with facial pain for the last 2 days  No SOB  Per my verbal conversation with WARD Zelaya Pt was instructed to report to the ED for evaluation       Pt's wife verbalized understanding and had no further questions

## 2023-03-22 ENCOUNTER — APPOINTMENT (OUTPATIENT)
Dept: URGENT CARE | Age: 48
End: 2023-03-22

## 2023-10-16 ENCOUNTER — HOSPITAL ENCOUNTER (EMERGENCY)
Facility: HOSPITAL | Age: 48
Discharge: HOME | End: 2023-10-16
Attending: EMERGENCY MEDICINE

## 2023-10-16 VITALS
HEART RATE: 72 BPM | TEMPERATURE: 98 F | DIASTOLIC BLOOD PRESSURE: 76 MMHG | RESPIRATION RATE: 18 BRPM | OXYGEN SATURATION: 98 % | SYSTOLIC BLOOD PRESSURE: 124 MMHG

## 2023-10-16 DIAGNOSIS — M54.40 LUMBAGO WITH SCIATICA: Primary | ICD-10-CM

## 2023-10-16 PROCEDURE — 99283 EMERGENCY DEPT VISIT LOW MDM: CPT

## 2023-10-16 PROCEDURE — 99284 EMERGENCY DEPT VISIT MOD MDM: CPT | Performed by: EMERGENCY MEDICINE

## 2023-10-16 RX ORDER — HYDROCODONE BITARTRATE AND ACETAMINOPHEN 5; 325 MG/1; MG/1
1 TABLET ORAL EVERY 6 HOURS PRN
Qty: 15 TABLET | Refills: 0 | Status: SHIPPED | OUTPATIENT
Start: 2023-10-16

## 2023-10-16 RX ORDER — CYCLOBENZAPRINE HCL 10 MG
10 TABLET ORAL 2 TIMES DAILY PRN
Qty: 20 TABLET | Refills: 0 | Status: SHIPPED | OUTPATIENT
Start: 2023-10-16 | End: 2023-10-26

## 2023-10-16 RX ORDER — PREDNISONE 20 MG/1
60 TABLET ORAL DAILY
Qty: 15 TABLET | Refills: 0 | Status: SHIPPED | OUTPATIENT
Start: 2023-10-16 | End: 2023-10-21

## 2023-10-16 NOTE — Clinical Note
Phillip Zamora was seen and treated in our emergency department on 10/16/2023. No restrictions            Diagnosis: Acute Lumbago with Sciatica    [place] Borne  may return to work on return date. He may return on this date: 10/18/2023         If you have any questions or concerns, please don't hesitate to call.       Silver Ray MD    ______________________________           _______________          _______________  Hospital Representative                              Date                                Time

## 2023-10-16 NOTE — ED PROVIDER NOTES
History  Chief Complaint   Patient presents with    Back Pain     Pt reports lower back pain since the weekend, pain radiating down R leg, difficulty ambulating d/t pain      Sofía Marks is a 46 y.o. male with low back pain radiating into the legs bilaterally. He had similar symptoms in the past.  No fevers, chills, sensory loss. No bowel or bladder incontinence. No ambulatory dysfunction. Pain is worse when lying down. Back Pain  Associated symptoms: no fever, no numbness and no weakness        Prior to Admission Medications   Prescriptions Last Dose Informant Patient Reported? Taking?   ondansetron (ZOFRAN) 4 mg tablet   No No   Sig: Take 1 tablet (4 mg total) by mouth every 6 (six) hours for 3 days      Facility-Administered Medications: None       History reviewed. No pertinent past medical history. Past Surgical History:   Procedure Laterality Date    CYSTOSCOPY      Diagnostic. Last assessed 7/31/2014        Family History   Problem Relation Age of Onset    Arthritis Mother     Stroke Father     Heart disease Father     No Known Problems Sister     No Known Problems Brother     No Known Problems Son     No Known Problems Daughter     No Known Problems Sister     No Known Problems Brother      I have reviewed and agree with the history as documented. E-Cigarette/Vaping    E-Cigarette Use Never User      E-Cigarette/Vaping Substances    Nicotine No     THC No     CBD No      Social History     Tobacco Use    Smoking status: Never    Smokeless tobacco: Never   Vaping Use    Vaping Use: Never used   Substance Use Topics    Alcohol use: No    Drug use: Never       Review of Systems   Constitutional:  Negative for chills and fever. Musculoskeletal:  Positive for back pain. Skin:  Negative for rash and wound. Neurological:  Negative for weakness and numbness. Physical Exam  Physical Exam  Vitals and nursing note reviewed. Constitutional:       General: He is in acute distress.       Appearance: He is well-developed. HENT:      Head: Normocephalic and atraumatic. Eyes:      Pupils: Pupils are equal, round, and reactive to light. Neck:      Vascular: No JVD. Cardiovascular:      Rate and Rhythm: Normal rate and regular rhythm. Heart sounds: Normal heart sounds. No murmur heard. No friction rub. No gallop. Pulmonary:      Effort: Pulmonary effort is normal. No respiratory distress. Breath sounds: Normal breath sounds. No wheezing or rales. Chest:      Chest wall: No tenderness. Musculoskeletal:         General: Tenderness (paraspinal lumbar region) present. Normal range of motion. Cervical back: Normal range of motion. Skin:     General: Skin is warm and dry. Neurological:      General: No focal deficit present. Mental Status: He is alert and oriented to person, place, and time. Sensory: Sensation is intact. Motor: Motor function is intact. No weakness. Gait: Gait normal.   Psychiatric:         Behavior: Behavior normal.         Thought Content: Thought content normal.         Judgment: Judgment normal.         Vital Signs  ED Triage Vitals [10/16/23 1609]   Temperature Pulse Respirations Blood Pressure SpO2   98 °F (36.7 °C) 72 18 124/76 98 %      Temp Source Heart Rate Source Patient Position - Orthostatic VS BP Location FiO2 (%)   Oral Monitor Sitting Right arm --      Pain Score       7           Vitals:    10/16/23 1609   BP: 124/76   Pulse: 72   Patient Position - Orthostatic VS: Sitting         Visual Acuity      ED Medications  Medications - No data to display    Diagnostic Studies  Results Reviewed       None                   No orders to display              Procedures  Procedures         ED Course                                             Medical Decision Making  46 y.o. male presents with chief complaint of bilateral low back pain radiating into both legs. No concerning s/s for cauda equina or epidural abscess.   Will treat with analgesia and anti-inflammatory regimen and refer to spine if symptoms persist.                Disposition  Final diagnoses:   Lumbago with sciatica     Time reflects when diagnosis was documented in both MDM as applicable and the Disposition within this note       Time User Action Codes Description Comment    10/16/2023  4:58 PM Radha Lan Add [M54.40] Lumbago with sciatica           ED Disposition       ED Disposition   Discharge    Condition   Stable    Date/Time   Mon Oct 16, 2023  4:59 PM    [address] discharge to home/self care.                    Follow-up Information    None         Patient's Medications   Discharge Prescriptions    CYCLOBENZAPRINE (FLEXERIL) 10 MG TABLET    Take 1 tablet (10 mg total) by mouth 2 (two) times a day as needed for muscle spasms for up to 10 days       Start Date: 10/16/2023End Date: 10/26/2023       Order Dose: 10 mg       Quantity: 20 tablet    Refills: 0    HYDROCODONE-ACETAMINOPHEN (NORCO) 5-325 MG PER TABLET    Take 1 tablet by mouth every 6 (six) hours as needed for pain for up to 15 doses Max Daily Amount: 4 tablets       Start Date: 10/16/2023End Date: --       Order Dose: 1 tablet       Quantity: 15 tablet    Refills: 0    PREDNISONE 20 MG TABLET    Take 3 tablets (60 mg total) by mouth daily for 5 days       Start Date: 10/16/2023End Date: 10/21/2023       Order Dose: 60 mg       Quantity: 15 tablet    Refills: 0           PDMP Review       None            ED Provider  Electronically Signed by             Jen Altamirano MD  10/16/23 4708

## 2023-10-18 ENCOUNTER — TELEPHONE (OUTPATIENT)
Dept: PHYSICAL THERAPY | Facility: OTHER | Age: 48
End: 2023-10-18

## 2023-10-18 NOTE — TELEPHONE ENCOUNTER
Call placed to the patient per [facility] referral.    Voice message left for patient to call back. Phone number and hours of business provided. This is the 1st attempt to reach the patient. Will defer per protocol.     G.E#[id number] Oral Ropben

## 2023-10-25 NOTE — TELEPHONE ENCOUNTER
Call placed to the patient per [facility] referral.     V/M left ([facility])  for patient to call back. Phone number and hours of business provided. This is the 2nd attempt to reach the patient. Will defer per protocol.

## 2024-02-05 ENCOUNTER — HOSPITAL ENCOUNTER (EMERGENCY)
Facility: HOSPITAL | Age: 49
Discharge: HOME | End: 2024-02-05
Attending: EMERGENCY MEDICINE

## 2024-02-05 VITALS
HEART RATE: 82 BPM | DIASTOLIC BLOOD PRESSURE: 82 MMHG | RESPIRATION RATE: 16 BRPM | OXYGEN SATURATION: 97 % | TEMPERATURE: 98.2 F | SYSTOLIC BLOOD PRESSURE: 130 MMHG

## 2024-02-05 DIAGNOSIS — H10.9 BILATERAL CONJUNCTIVITIS: Primary | ICD-10-CM

## 2024-02-05 PROCEDURE — 99284 EMERGENCY DEPT VISIT MOD MDM: CPT | Performed by: EMERGENCY MEDICINE

## 2024-02-05 PROCEDURE — 99282 EMERGENCY DEPT VISIT SF MDM: CPT

## 2024-02-05 RX ORDER — PREDNISOLONE ACETATE 10 MG/ML
1 SUSPENSION/ DROPS OPHTHALMIC ONCE
Status: COMPLETED | OUTPATIENT
Start: 2024-02-05 | End: 2024-02-05

## 2024-02-05 RX ORDER — PREDNISOLONE ACETATE 10 MG/ML
1 SUSPENSION/ DROPS OPHTHALMIC 4 TIMES DAILY
Qty: 5 ML | Refills: 0 | Status: SHIPPED | OUTPATIENT
Start: 2024-02-05

## 2024-02-05 RX ADMIN — PREDNISOLONE ACETATE 1 DROP: 10 SUSPENSION/ DROPS OPHTHALMIC at 19:22
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
## 2024-04-12 ENCOUNTER — HOSPITAL ENCOUNTER (EMERGENCY)
Facility: HOSPITAL | Age: 49
Discharge: HOME | End: 2024-04-13
Attending: EMERGENCY MEDICINE | Admitting: EMERGENCY MEDICINE

## 2024-04-12 ENCOUNTER — APPOINTMENT (EMERGENCY)
Dept: CT IMAGING | Facility: HOSPITAL | Age: 49
End: 2024-04-12

## 2024-04-12 ENCOUNTER — APPOINTMENT (EMERGENCY)
Dept: RADIOLOGY | Facility: HOSPITAL | Age: 49
End: 2024-04-12

## 2024-04-12 VITALS
HEART RATE: 111 BPM | DIASTOLIC BLOOD PRESSURE: 80 MMHG | RESPIRATION RATE: 18 BRPM | TEMPERATURE: 98.6 F | SYSTOLIC BLOOD PRESSURE: 138 MMHG | OXYGEN SATURATION: 94 %

## 2024-04-12 DIAGNOSIS — J32.9 SINUSITIS: ICD-10-CM

## 2024-04-12 DIAGNOSIS — J33.9 NASAL POLYP: ICD-10-CM

## 2024-04-12 DIAGNOSIS — J02.0 STREP THROAT: ICD-10-CM

## 2024-04-12 DIAGNOSIS — J11.1 INFLUENZA: ICD-10-CM

## 2024-04-12 DIAGNOSIS — E87.6 HYPOKALEMIA: ICD-10-CM

## 2024-04-12 DIAGNOSIS — B34.9 ACUTE VIRAL SYNDROME: Primary | ICD-10-CM

## 2024-04-12 PROCEDURE — 83605 ASSAY OF LACTIC ACID: CPT

## 2024-04-12 PROCEDURE — 96365 THER/PROPH/DIAG IV INF INIT: CPT

## 2024-04-12 PROCEDURE — 85610 PROTHROMBIN TIME: CPT

## 2024-04-12 PROCEDURE — 70450 CT HEAD/BRAIN W/O DYE: CPT

## 2024-04-12 PROCEDURE — 36415 COLL VENOUS BLD VENIPUNCTURE: CPT

## 2024-04-12 PROCEDURE — 99284 EMERGENCY DEPT VISIT MOD MDM: CPT

## 2024-04-12 PROCEDURE — 80053 COMPREHEN METABOLIC PANEL: CPT

## 2024-04-12 PROCEDURE — 85730 THROMBOPLASTIN TIME PARTIAL: CPT

## 2024-04-12 PROCEDURE — 85025 COMPLETE CBC W/AUTO DIFF WBC: CPT

## 2024-04-12 PROCEDURE — 87040 BLOOD CULTURE FOR BACTERIA: CPT

## 2024-04-12 PROCEDURE — 71046 X-RAY EXAM CHEST 2 VIEWS: CPT

## 2024-04-12 PROCEDURE — 87651 STREP A DNA AMP PROBE: CPT | Performed by: EMERGENCY MEDICINE

## 2024-04-12 PROCEDURE — 0241U HB NFCT DS VIR RESP RNA 4 TRGT: CPT | Performed by: EMERGENCY MEDICINE

## 2024-04-12 RX ORDER — ONDANSETRON 2 MG/ML
4 INJECTION INTRAMUSCULAR; INTRAVENOUS ONCE
Status: DISCONTINUED | OUTPATIENT
Start: 2024-04-12 | End: 2024-04-13 | Stop reason: HOSPADM

## 2024-04-12 RX ORDER — ACETAMINOPHEN 325 MG/1
975 TABLET ORAL ONCE
Status: COMPLETED | OUTPATIENT
Start: 2024-04-12 | End: 2024-04-13

## 2024-04-12 RX ADMIN — SODIUM CHLORIDE, SODIUM LACTATE, POTASSIUM CHLORIDE, AND CALCIUM CHLORIDE 1000 ML: .6; .31; .03; .02 INJECTION, SOLUTION INTRAVENOUS at 23:48

## 2024-04-13 LAB
ALBUMIN SERPL BCP-MCNC: 4.1 G/DL (ref 3.5–5)
ALP SERPL-CCNC: 125 U/L (ref 34–104)
ALT SERPL W P-5'-P-CCNC: 31 U/L (ref 7–52)
ANION GAP SERPL CALCULATED.3IONS-SCNC: 8 MMOL/L (ref 4–13)
APTT PPP: 30 SECONDS (ref 23–37)
AST SERPL W P-5'-P-CCNC: 21 U/L (ref 13–39)
ATRIAL RATE: 99 BPM
BASOPHILS # BLD AUTO: 0.03 THOUSANDS/ÂΜL (ref 0–0.1)
BASOPHILS NFR BLD AUTO: 1 % (ref 0–1)
BILIRUB SERPL-MCNC: 0.38 MG/DL (ref 0.2–1)
BILIRUB UR QL STRIP: NEGATIVE
BUN SERPL-MCNC: 11 MG/DL (ref 5–25)
CALCIUM SERPL-MCNC: 8.7 MG/DL (ref 8.4–10.2)
CHLORIDE SERPL-SCNC: 102 MMOL/L (ref 96–108)
CLARITY UR: CLEAR
CO2 SERPL-SCNC: 28 MMOL/L (ref 21–32)
COLOR UR: COLORLESS
CREAT SERPL-MCNC: 0.78 MG/DL (ref 0.6–1.3)
EOSINOPHIL # BLD AUTO: 0.07 THOUSAND/ÂΜL (ref 0–0.61)
EOSINOPHIL NFR BLD AUTO: 1 % (ref 0–6)
ERYTHROCYTE [DISTWIDTH] IN BLOOD BY AUTOMATED COUNT: 13.2 % (ref 11.6–15.1)
FLUAV RNA RESP QL NAA+PROBE: NEGATIVE
FLUBV RNA RESP QL NAA+PROBE: POSITIVE
GFR SERPL CREATININE-BSD FRML MDRD: 104 ML/MIN/1.73SQ M
GLUCOSE SERPL-MCNC: 116 MG/DL (ref 65–140)
GLUCOSE UR STRIP-MCNC: NEGATIVE MG/DL
HCT VFR BLD AUTO: 52.1 % (ref 36.5–49.3)
HGB BLD-MCNC: 17.3 G/DL (ref 12–17)
HGB UR QL STRIP.AUTO: NEGATIVE
IMM GRANULOCYTES # BLD AUTO: 0.07 THOUSAND/UL (ref 0–0.2)
IMM GRANULOCYTES NFR BLD AUTO: 1 % (ref 0–2)
INR PPP: 0.9 (ref 0.84–1.19)
KETONES UR STRIP-MCNC: NEGATIVE MG/DL
LACTATE SERPL-SCNC: 0.9 MMOL/L (ref 0.5–2)
LEUKOCYTE ESTERASE UR QL STRIP: NEGATIVE
LYMPHOCYTES # BLD AUTO: 1.07 THOUSANDS/ÂΜL (ref 0.6–4.47)
LYMPHOCYTES NFR BLD AUTO: 18 % (ref 14–44)
MCH RBC QN AUTO: 28.7 PG (ref 26.8–34.3)
MCHC RBC AUTO-ENTMCNC: 33.2 G/DL (ref 31.4–37.4)
MCV RBC AUTO: 86 FL (ref 82–98)
MONOCYTES # BLD AUTO: 0.57 THOUSAND/ÂΜL (ref 0.17–1.22)
MONOCYTES NFR BLD AUTO: 9 % (ref 4–12)
NEUTROPHILS # BLD AUTO: 4.28 THOUSANDS/ÂΜL (ref 1.85–7.62)
NEUTS SEG NFR BLD AUTO: 70 % (ref 43–75)
NITRITE UR QL STRIP: NEGATIVE
NRBC BLD AUTO-RTO: 0 /100 WBCS
P AXIS: 56 DEGREES
PH UR STRIP.AUTO: 7 [PH]
PLATELET # BLD AUTO: 195 THOUSANDS/UL (ref 149–390)
PMV BLD AUTO: 10.2 FL (ref 8.9–12.7)
POTASSIUM SERPL-SCNC: 3.2 MMOL/L (ref 3.5–5.3)
PR INTERVAL: 192 MS
PROT SERPL-MCNC: 7.4 G/DL (ref 6.4–8.4)
PROT UR STRIP-MCNC: NEGATIVE MG/DL
PROTHROMBIN TIME: 12.7 SECONDS (ref 11.6–14.5)
QRS AXIS: 46 DEGREES
QRSD INTERVAL: 88 MS
QT INTERVAL: 346 MS
QTC INTERVAL: 444 MS
RBC # BLD AUTO: 6.03 MILLION/UL (ref 3.88–5.62)
RSV RNA RESP QL NAA+PROBE: NEGATIVE
S PYO DNA THROAT QL NAA+PROBE: DETECTED
SARS-COV-2 RNA RESP QL NAA+PROBE: NEGATIVE
SODIUM SERPL-SCNC: 138 MMOL/L (ref 135–147)
SP GR UR STRIP.AUTO: 1.01 (ref 1–1.03)
T WAVE AXIS: 20 DEGREES
UROBILINOGEN UR STRIP-ACNC: <2 MG/DL
VENTRICULAR RATE: 99 BPM
WBC # BLD AUTO: 6.09 THOUSAND/UL (ref 4.31–10.16)

## 2024-04-13 PROCEDURE — 36415 COLL VENOUS BLD VENIPUNCTURE: CPT

## 2024-04-13 PROCEDURE — 81003 URINALYSIS AUTO W/O SCOPE: CPT

## 2024-04-13 PROCEDURE — 93005 ELECTROCARDIOGRAM TRACING: CPT

## 2024-04-13 PROCEDURE — 96366 THER/PROPH/DIAG IV INF ADDON: CPT

## 2024-04-13 PROCEDURE — 87040 BLOOD CULTURE FOR BACTERIA: CPT

## 2024-04-13 RX ORDER — IBUPROFEN 600 MG/1
600 TABLET ORAL ONCE
Status: COMPLETED | OUTPATIENT
Start: 2024-04-13 | End: 2024-04-13

## 2024-04-13 RX ORDER — AMOXICILLIN AND CLAVULANATE POTASSIUM 875; 125 MG/1; MG/1
1 TABLET, FILM COATED ORAL EVERY 12 HOURS
Qty: 20 TABLET | Refills: 0 | Status: SHIPPED | OUTPATIENT
Start: 2024-04-13 | End: 2024-04-23

## 2024-04-13 RX ORDER — POTASSIUM CHLORIDE 20 MEQ/1
20 TABLET, EXTENDED RELEASE ORAL ONCE
Status: COMPLETED | OUTPATIENT
Start: 2024-04-13 | End: 2024-04-13

## 2024-04-13 RX ORDER — AMOXICILLIN AND CLAVULANATE POTASSIUM 875; 125 MG/1; MG/1
1 TABLET, FILM COATED ORAL ONCE
Status: COMPLETED | OUTPATIENT
Start: 2024-04-13 | End: 2024-04-13

## 2024-04-13 RX ADMIN — AMOXICILLIN AND CLAVULANATE POTASSIUM 1 TABLET: 875; 125 TABLET, FILM COATED ORAL at 02:35

## 2024-04-13 RX ADMIN — ACETAMINOPHEN 975 MG: 325 TABLET, FILM COATED ORAL at 00:24

## 2024-04-13 RX ADMIN — POTASSIUM CHLORIDE 20 MEQ: 1500 TABLET, EXTENDED RELEASE ORAL at 00:24

## 2024-04-13 RX ADMIN — IBUPROFEN 600 MG: 600 TABLET, FILM COATED ORAL at 02:35

## 2024-04-13 NOTE — ED ATTENDING ATTESTATION
4/12/2024  I, Manoj Roca MD, saw and evaluated the patient. I have discussed the patient with the resident/non-physician practitioner and agree with the resident's/non-physician practitioner's findings, Plan of Care, and MDM as documented in the resident's/non-physician practitioner's note, except where noted. All available labs and Radiology studies were reviewed.  I was present for key portions of any procedure(s) performed by the resident/non-physician practitioner and I was immediately available to provide assistance.       At this point I agree with the current assessment done in the Emergency Department.  I have conducted an independent evaluation of this patient a history and physical is as follows: Patient is a 49 year old male with fever and headache and cough since earlier today. (+) nausea. No vomiting or diarrhea. No sore throat. No urinary sx. No travel. (+) ill contact. Was last seen in this ED on 2/5/24 for bilateral conjunctivitis. [facility] website checked on this patient and no Rx found. NCAT. PERRL. No conjunctival injection or discharge. (+) mild pharyngeal erythema. ENT exam as per ED resident. Neck supple. Lungs clear. Tachycardia without murmur. Abdomen soft and nontender. Good bowel sounds. No rash noted. No edema. DDx including but not limited to: viral illness, pneumonia, URI, OM, pharyngitis, influenza, COVID-19 (novel coronavirus), cellulitis, UTI; doubt meningitis, meningococcemia; sinusitis. Will check labs and CXR.     ED Course         Critical Care Time  Procedures

## 2024-04-13 NOTE — ED PROVIDER NOTES
"History  Chief Complaint   Patient presents with    Fever     Patient comes in reporting fever that began this morning. States he has HA as well. Rates pain 8/10 describes as pressure., States he took tylenol and ibuprofen at home w/o relief. Last medicated @ 1800 w/ advil. Also reporting cough, nonproductive.      Charly is a 49-year-old male with no known medical problems who presents with chief complaint headache.  States he had onset of fever, chills, myalgias, nasal congestion, and dry cough earlier in the day, then had sudden onset of a throbbing, frontal, nonradiating headache this afternoon, states it \"hit him all at once\" and is the worst headache he's ever had.  Was nauseated at onset of headache but denies vomiting.  Took 200mg ibuprofen at onset of headache without relief.  Denies blurred vision, photophobia, dizziness, lightheadedness, neck pain or stiffness, earache, sore throat, chest pain, dyspnea, abdominal pain, diarrhea, blood in stool, dysuria, hematuria, or rash.  Has no history of headaches, denies trauma, denies known sick contacts. No recent travel.        Prior to Admission Medications   Prescriptions Last Dose Informant Patient Reported? Taking?   HYDROcodone-acetaminophen (NORCO) 5-325 mg per tablet   No No   Sig: Take 1 tablet by mouth every 6 (six) hours as needed for pain for up to 15 doses Max Daily Amount: 4 tablets   cyclobenzaprine (FLEXERIL) 10 mg tablet   No No   Sig: Take 1 tablet (10 mg total) by mouth 2 (two) times a day as needed for muscle spasms for up to 10 days   ondansetron (ZOFRAN) 4 mg tablet   No No   Sig: Take 1 tablet (4 mg total) by mouth every 6 (six) hours for 3 days   prednisoLONE acetate (PRED FORTE) 1 % ophthalmic suspension   No No   Sig: Administer 1 drop to both eyes 4 (four) times a day      Facility-Administered Medications: None       History reviewed. No pertinent past medical history.    Past Surgical History:   Procedure Laterality Date    CYSTOSCOPY   "    Diagnostic. Last assessed 7/31/2014        Family History   Problem Relation Age of Onset    Arthritis Mother     Stroke Father     Heart disease Father     No Known Problems Sister     No Known Problems Brother     No Known Problems Son     No Known Problems Daughter     No Known Problems Sister     No Known Problems Brother      I have reviewed and agree with the history as documented.    E-Cigarette/Vaping    E-Cigarette Use Never User      E-Cigarette/Vaping Substances    Nicotine No     THC No     CBD No      Social History     Tobacco Use    Smoking status: Never    Smokeless tobacco: Never   Vaping Use    Vaping status: Never Used   Substance Use Topics    Alcohol use: No    Drug use: Never        Review of Systems   Constitutional:  Positive for chills, fatigue and fever. Negative for appetite change and diaphoresis.   HENT:  Positive for congestion, rhinorrhea and sinus pressure. Negative for ear pain, hearing loss, sinus pain, sneezing, sore throat, tinnitus, trouble swallowing and voice change.    Eyes: Negative.  Negative for photophobia, pain, discharge, redness, itching and visual disturbance.   Respiratory:  Positive for cough. Negative for chest tightness, shortness of breath and wheezing.    Cardiovascular: Negative.  Negative for chest pain, palpitations and leg swelling.   Gastrointestinal:  Positive for nausea. Negative for abdominal distention, abdominal pain, blood in stool, constipation, diarrhea and vomiting.   Endocrine: Negative.    Genitourinary: Negative.  Negative for decreased urine volume, dysuria, flank pain and hematuria.   Musculoskeletal:  Positive for myalgias. Negative for back pain, neck pain and neck stiffness.   Skin: Negative.  Negative for pallor and rash.   Allergic/Immunologic: Negative.    Neurological:  Positive for headaches. Negative for dizziness, seizures, syncope, facial asymmetry, speech difficulty, weakness, light-headedness and numbness.   Hematological:  Negative.  Negative for adenopathy. Does not bruise/bleed easily.   Psychiatric/Behavioral: Negative.     All other systems reviewed and are negative.      Physical Exam  ED Triage Vitals   Temperature Pulse Respirations Blood Pressure SpO2   04/12/24 2313 04/12/24 2313 04/12/24 2313 04/12/24 2313 04/12/24 2313   98.6 °F (37 °C) (!) 111 18 138/80 94 %      Temp src Heart Rate Source Patient Position - Orthostatic VS BP Location FiO2 (%)   -- 04/12/24 2313 04/12/24 2313 04/12/24 2313 --    Monitor Sitting Right arm       Pain Score       04/13/24 0024       Med Not Given for Pain - for MAR use only             Orthostatic Vital Signs  Vitals:    04/12/24 2313   BP: 138/80   Pulse: (!) 111   Patient Position - Orthostatic VS: Sitting       Physical Exam  Vitals and nursing note reviewed. Exam conducted with a chaperone present.   Constitutional:       General: He is not in acute distress.     Appearance: He is not diaphoretic.   HENT:      Head: Normocephalic and atraumatic.      Right Ear: Tympanic membrane and external ear normal.      Left Ear: Tympanic membrane and external ear normal.      Nose: Congestion present. No rhinorrhea.      Mouth/Throat:      Mouth: Mucous membranes are moist.      Pharynx: Oropharynx is clear. Posterior oropharyngeal erythema present. No oropharyngeal exudate.      Comments: Mild posterior oropharyngeal erythema without exudates, ulcerations, uvular swelling, or oropharyngeal edema.  Tonsils 2+ and without exudates or ulcerations.  No petechiae.  Eyes:      General: No scleral icterus.        Right eye: No discharge.         Left eye: No discharge.      Extraocular Movements: Extraocular movements intact.      Conjunctiva/sclera: Conjunctivae normal.      Pupils: Pupils are equal, round, and reactive to light.   Cardiovascular:      Rate and Rhythm: Regular rhythm. Tachycardia present.      Pulses: Normal pulses.      Heart sounds: Normal heart sounds. No murmur heard.     No friction  rub. No gallop.   Pulmonary:      Effort: Pulmonary effort is normal. No respiratory distress.      Breath sounds: Normal breath sounds.   Chest:      Chest wall: No tenderness.   Abdominal:      General: Abdomen is flat. Bowel sounds are normal. There is no distension.      Palpations: Abdomen is soft.      Tenderness: There is no abdominal tenderness. There is no right CVA tenderness, left CVA tenderness, guarding or rebound.   Musculoskeletal:         General: Normal range of motion.      Cervical back: Normal range of motion and neck supple. No tenderness.      Right lower leg: No edema.      Left lower leg: No edema.   Lymphadenopathy:      Cervical: No cervical adenopathy.   Skin:     General: Skin is warm and dry.      Capillary Refill: Capillary refill takes less than 2 seconds.      Coloration: Skin is not pale.      Findings: No rash.   Neurological:      General: No focal deficit present.      Mental Status: He is alert and oriented to person, place, and time.      Cranial Nerves: No cranial nerve deficit.      Sensory: No sensory deficit.      Motor: No weakness.      Coordination: Coordination normal.      Gait: Gait normal.   Psychiatric:         Mood and Affect: Mood normal.         Behavior: Behavior normal.         ED Medications  Medications   lactated ringers bolus 1,000 mL (0 mL Intravenous Stopped 4/13/24 0142)   acetaminophen (TYLENOL) tablet 975 mg (975 mg Oral Given 4/13/24 0024)   potassium chloride (Klor-Con M20) CR tablet 20 mEq (20 mEq Oral Given 4/13/24 0024)   amoxicillin-clavulanate (AUGMENTIN) 875-125 mg per tablet 1 tablet (1 tablet Oral Given 4/13/24 0235)   ibuprofen (MOTRIN) tablet 600 mg (600 mg Oral Given 4/13/24 0235)       Diagnostic Studies  Results Reviewed       Procedure Component Value Units Date/Time    UA w Reflex to Microscopic w Reflex to Culture [[id number]] Collected: 04/13/24 0148    Lab Status: Final result Specimen: Urine, Clean Catch Updated: 04/13/24 9770      Color, UA Colorless     Clarity, UA Clear     Specific Gravity, UA 1.010     pH, UA 7.0     Leukocytes, UA Negative     Nitrite, UA Negative     Protein, UA Negative mg/dl      Glucose, UA Negative mg/dl      Ketones, UA Negative mg/dl      Urobilinogen, UA <2.0 mg/dl      Bilirubin, UA Negative     Occult Blood, UA Negative    Lactic acid, plasma (w/reflex if result > 2.0) [[id number]]  (Normal) Collected: 04/12/24 2349    Lab Status: Final result Specimen: Blood from Arm, Right Updated: 04/13/24 0119     LACTIC ACID 0.9 mmol/L     Narrative:      Result may be elevated if tourniquet was used during collection.    Blood culture #1 [[id number]] Collected: 04/13/24 0030    Lab Status: In process Specimen: Blood from Arm, Left Updated: 04/13/24 0036    Strep A PCR [[id number]]  (Abnormal) Collected: 04/12/24 2349    Lab Status: Final result Specimen: Throat Updated: 04/13/24 0035     STREP A PCR Detected    Comprehensive metabolic panel [[id number]]  (Abnormal) Collected: 04/12/24 2349    Lab Status: Final result Specimen: Blood from Arm, Right Updated: 04/13/24 0017     Sodium 138 mmol/L      Potassium 3.2 mmol/L      Chloride 102 mmol/L      CO2 28 mmol/L      ANION GAP 8 mmol/L      BUN 11 mg/dL      Creatinine 0.78 mg/dL      Glucose 116 mg/dL      Calcium 8.7 mg/dL      AST 21 U/L      ALT 31 U/L      Alkaline Phosphatase 125 U/L      Total Protein 7.4 g/dL      Albumin 4.1 g/dL      Total Bilirubin 0.38 mg/dL      eGFR 104 ml/min/1.73sq m     Narrative:      National Kidney Disease Foundation guidelines for Chronic Kidney Disease (CKD):     Stage 1 with normal or high GFR (GFR > 90 mL/min/1.73 square meters)    Stage 2 Mild CKD (GFR = 60-89 mL/min/1.73 square meters)    Stage 3A Moderate CKD (GFR = 45-59 mL/min/1.73 square meters)    Stage 3B Moderate CKD (GFR = 30-44 mL/min/1.73 square meters)    Stage 4 Severe CKD (GFR = 15-29 mL/min/1.73 square meters)    Stage 5 End Stage CKD (GFR <15 mL/min/1.73 square  meters)  Note: GFR calculation is accurate only with a steady state creatinine    FLU/RSV/COVID - if FLU/RSV clinically relevant [[id number]]  (Abnormal) Collected: 04/12/24 2316    Lab Status: Final result Specimen: Nares from Nose Updated: 04/13/24 0014     SARS-CoV-2 Negative     INFLUENZA A PCR Negative     INFLUENZA B PCR Positive     RSV PCR Negative    Narrative:      FOR PEDIATRIC PATIENTS - copy/paste COVID Guidelines URL to browser: [email or web address]    SARS-CoV-2 assay is a Nucleic Acid Amplification assay intended for the  qualitative detection of nucleic acid from SARS-CoV-2 in nasopharyngeal  swabs. Results are for the presumptive identification of SARS-CoV-2 RNA.    Positive results are indicative of infection with SARS-CoV-2, the virus  causing COVID-19, but do not rule out bacterial infection or co-infection  with other viruses. Laboratories within the [place] and its  territories are required to report all positive results to the appropriate  public health authorities. Negative results do not preclude SARS-CoV-2  infection and should not be used as the sole basis for treatment or other  patient management decisions. Negative results must be combined with  clinical observations, patient history, and epidemiological information.  This test has not been FDA cleared or approved.    This test has been authorized by FDA under an Emergency Use Authorization  (EUA). This test is only authorized for the duration of time the  declaration that circumstances exist justifying the authorization of the  emergency use of an in vitro diagnostic tests for detection of SARS-CoV-2  virus and/or diagnosis of COVID-19 infection under section 564(b)(1) of  the Act, 21 U.S.C. 360bbb-3(b)(1), unless the authorization is terminated  or revoked sooner. The test has been validated but independent review by FDA  and CLIA is pending.    Test performed using [employer]  GeneXpert: This RT-PCR assay targets N2,  a region unique to SARS-CoV-2. A conserved region in the E-gene was chosen  for pan-Sarbecovirus detection which includes SARS-CoV-2.    According to CMS-2020-01-R, this platform meets the definition of high-throughput technology.    Protime-INR [[id number]]  (Normal) Collected: 04/12/24 2349    Lab Status: Final result Specimen: Blood from Arm, Right Updated: 04/13/24 0013     Protime 12.7 seconds      INR 0.90    APTT [[id number]]  (Normal) Collected: 04/12/24 2349    Lab Status: Final result Specimen: Blood from Arm, Right Updated: 04/13/24 0013     PTT 30 seconds     CBC and differential [[id number]]  (Abnormal) Collected: 04/12/24 2349    Lab Status: Final result Specimen: Blood from Arm, Right Updated: 04/13/24 0000     WBC 6.09 Thousand/uL      RBC 6.03 Million/uL      Hemoglobin 17.3 g/dL      Hematocrit 52.1 %      MCV 86 fL      MCH 28.7 pg      MCHC 33.2 g/dL      RDW 13.2 %      MPV 10.2 fL      Platelets 195 Thousands/uL      nRBC 0 /100 WBCs      Segmented % 70 %      Immature Grans % 1 %      Lymphocytes % 18 %      Monocytes % 9 %      Eosinophils Relative 1 %      Basophils Relative 1 %      Absolute Neutrophils 4.28 Thousands/µL      Absolute Immature Grans 0.07 Thousand/uL      Absolute Lymphocytes 1.07 Thousands/µL      Absolute Monocytes 0.57 Thousand/µL      Eosinophils Absolute 0.07 Thousand/µL      Basophils Absolute 0.03 Thousands/µL     Blood culture #2 [[id number]] Collected: 04/12/24 2349    Lab Status: In process Specimen: Blood from Arm, Right Updated: 04/12/24 2355                   XR chest 2 views   ED Interpretation by Cindy Sargent DO (04/13 0019)   No obvious acute cardiopulmonary findings as per my independent interpretation.      CT head without contrast   Final Result by Emil Mendenhall DO (04/13 0208)      No acute intracranial abnormality is seen.      Other findings as above.                  Workstation performed:  [id number]               Procedures  ECG 12 Lead Documentation Only    Date/Time: 4/13/2024 12:14 AM    Performed by: Cindy Sargent DO  Authorized by: Cindy Sargent DO    Indications / Diagnosis:  Check qTC  ECG reviewed by me, the ED Provider: yes    Patient location:  ED  Previous ECG:     Previous ECG:  Compared to current    Comparison ECG info:  1/24/22    Similarity:  No change  Interpretation:     Interpretation: normal    Rate:     ECG rate:  99    ECG rate assessment: normal    Rhythm:     Rhythm: sinus rhythm    Ectopy:     Ectopy: none    QRS:     QRS axis:  Normal    QRS intervals:  Normal  Conduction:     Conduction: normal    ST segments:     ST segments:  Normal  T waves:     T waves: normal          ED Course  ED Course as of 04/13/24 0547   Sat Apr 13, 2024   0017 INFLU B PCR(!): Positive   0019 XR chest 2 views  No obvious acute cardiopulmonary findings as per my independent interpretation.   0019 Potassium(!): 3.2  Will replete orally.   0200 UA w Reflex to Microscopic w Reflex to Culture  Normal.   0200 STREP A PCR(!): Detected   0200 WBC: 6.09   0212 CT head without contrast  IMPRESSION:     No acute intracranial abnormality is seen.     Other findings as above.        0212 Will refer to ENT for nasal polyp.  CT with evidence of sinusitis as well.   Will treat with augmentin to cover both sinusitis and strep throat.  Pt declines tamiflu.                             SBIRT 22yo+      Flowsheet Row Most Recent Value   Initial Alcohol Screen: US AUDIT-C     1. How often do you have a drink containing alcohol? 0 Filed at: 04/12/2024 2312   2. How many drinks containing alcohol do you have on a typical day you are drinking?  0 Filed at: 04/12/2024 2312   3a. Male UNDER 65: How often do you have five or more drinks on one occasion? 0 Filed at: 04/12/2024 2312   3b. FEMALE Any Age, or MALE 65+: How often do you have 4 or more drinks on one occassion? 0 Filed at: 04/12/2024 2312    Audit-C Score 0 Filed at: 04/12/2024 2312   TAVO: How many times in the past year have you...    Used an illegal drug or used a prescription medication for non-medical reasons? Never Filed at: 04/12/2024 2312                  Medical Decision Making  Patient presents with viral symptoms as above, however does state he had sudden onset of the worst headache of his life earlier this evening not relieved by a very small dose of ibuprofen.  Is tachycardic but afebrile.  Remainder vital signs within normal limits.  Physical exam consistent with viral syndrome, however cannot exclude sinusitis, strep pharyngitis, less likely pneumonia.  No neurologic deficits, however will obtain CT head to evaluate for ICH, however this is unlikely.  Will also give IV fluids and tylenol, reassess.  See ED course for remainder of discussion.    Of note, headache relieved with tylenol, however did request dose of ibuprofen prior to discharge to prevent headache recurrence.     Amount and/or Complexity of Data Reviewed  External Data Reviewed: labs, ECG and notes.  Labs: ordered. Decision-making details documented in ED Course.  Radiology: ordered and independent interpretation performed. Decision-making details documented in ED Course.  ECG/medicine tests: ordered and independent interpretation performed.    Risk  OTC drugs.  Prescription drug management.          Disposition  Final diagnoses:   Acute viral syndrome   Strep throat   Influenza   Sinusitis   Hypokalemia   Nasal polyp     Time reflects when diagnosis was documented in both MDM as applicable and the Disposition within this note       Time User Action Codes Description Comment    4/13/2024  2:18 AM Cindy Sargent [B34.9] Acute viral syndrome     4/13/2024  2:19 AM Cindy Sargent [J02.0] Strep throat     4/13/2024  2:19 AM Cindy Sargent [J11.1] Influenza     4/13/2024  2:19 AM Cindy Sargent [J32.9] Sinusitis     4/13/2024  2:19 AM Cindy Sargent  [E87.6] Hypokalemia     4/13/2024  2:19 AM Cindy Sargent Add [J33.9] Nasal polyp           ED Disposition       ED Disposition   Discharge    Condition   Stable    Date/Time   Sat Apr 13, 2024 0218    Comment   Charly Nair discharge to home/self care.                   Follow-up Information    None         Discharge Medication List as of 4/13/2024  2:23 AM        START taking these medications    Details   amoxicillin-clavulanate (AUGMENTIN) 875-125 mg per tablet Take 1 tablet by mouth every 12 (twelve) hours for 10 days, Starting Sat 4/13/2024, Until Tue 4/23/2024, Normal           CONTINUE these medications which have NOT CHANGED    Details   cyclobenzaprine (FLEXERIL) 10 mg tablet Take 1 tablet (10 mg total) by mouth 2 (two) times a day as needed for muscle spasms for up to 10 days, Starting Mon 10/16/2023, Until Thu 10/26/2023 at 2359, Normal      HYDROcodone-acetaminophen (NORCO) 5-325 mg per tablet Take 1 tablet by mouth every 6 (six) hours as needed for pain for up to 15 doses Max Daily Amount: 4 tablets, Starting Mon 10/16/2023, Normal      ondansetron (ZOFRAN) 4 mg tablet Take 1 tablet (4 mg total) by mouth every 6 (six) hours for 3 days, Starting Tue 1/25/2022, Until Fri 1/28/2022, Normal      prednisoLONE acetate (PRED FORTE) 1 % ophthalmic suspension Administer 1 drop to both eyes 4 (four) times a day, Starting Mon 2/5/2024, Normal               PDMP Review         Value Time User    PDMP Reviewed  Yes 4/12/2024 11:24 PM Manoj Roca MD             ED Provider  Attending physically available and evaluated Charly Nair. I managed the patient along with the ED Attending.    Electronically Signed by           Cindy Sargent DO  04/13/24 9996

## 2024-04-13 NOTE — DISCHARGE INSTRUCTIONS
Please take 600mg ibuprofen every 8 hours as needed for fever and headache.  You may also take 1000mg acetaminophen(tylenol) every 6 hours as needed for fever and pain.   Please take the full course of antibiotics, even if you feel better before you finish taking them.

## 2024-04-15 LAB
ATRIAL RATE: 99 BPM
P AXIS: 56 DEGREES
PR INTERVAL: 192 MS
QRS AXIS: 46 DEGREES
QRSD INTERVAL: 88 MS
QT INTERVAL: 346 MS
QTC INTERVAL: 444 MS
T WAVE AXIS: 20 DEGREES
VENTRICULAR RATE: 99 BPM

## 2024-04-15 PROCEDURE — 93010 ELECTROCARDIOGRAM REPORT: CPT | Performed by: INTERNAL MEDICINE

## 2024-04-18 LAB
BACTERIA BLD CULT: NORMAL
BACTERIA BLD CULT: NORMAL

## 2024-07-09 ENCOUNTER — HOSPITAL ENCOUNTER (EMERGENCY)
Facility: HOSPITAL | Age: 49
Discharge: HOME | End: 2024-07-09
Attending: EMERGENCY MEDICINE

## 2024-07-09 VITALS
OXYGEN SATURATION: 97 % | TEMPERATURE: 97.8 F | RESPIRATION RATE: 18 BRPM | DIASTOLIC BLOOD PRESSURE: 77 MMHG | HEART RATE: 94 BPM | SYSTOLIC BLOOD PRESSURE: 118 MMHG

## 2024-07-09 DIAGNOSIS — G89.29 CHRONIC MIDLINE LOW BACK PAIN WITH BILATERAL SCIATICA: Primary | ICD-10-CM

## 2024-07-09 DIAGNOSIS — M54.42 CHRONIC MIDLINE LOW BACK PAIN WITH BILATERAL SCIATICA: Primary | ICD-10-CM

## 2024-07-09 DIAGNOSIS — M54.41 CHRONIC MIDLINE LOW BACK PAIN WITH BILATERAL SCIATICA: Primary | ICD-10-CM

## 2024-07-09 PROCEDURE — 96372 THER/PROPH/DIAG INJ SC/IM: CPT

## 2024-07-09 PROCEDURE — 99283 EMERGENCY DEPT VISIT LOW MDM: CPT

## 2024-07-09 PROCEDURE — 99284 EMERGENCY DEPT VISIT MOD MDM: CPT | Performed by: EMERGENCY MEDICINE

## 2024-07-09 RX ORDER — KETOROLAC TROMETHAMINE 30 MG/ML
30 INJECTION, SOLUTION INTRAMUSCULAR; INTRAVENOUS ONCE
Status: COMPLETED | OUTPATIENT
Start: 2024-07-09 | End: 2024-07-09

## 2024-07-09 RX ADMIN — KETOROLAC TROMETHAMINE 30 MG: 30 INJECTION, SOLUTION INTRAMUSCULAR at 16:52

## 2024-07-09 NOTE — DISCHARGE INSTRUCTIONS
-Llame  [phone] ([phone]), opción 6 o complete el formulario a continuación y mayank enfermera de [facility] se comunicará con usted.    -Regrese por cualquier empeoramiento del dolor u otros síntomas preocupantes    -Pide mayank anna marie con tu médico de atención primaria tan pronto mitzy puedas

## 2024-07-09 NOTE — ED PROVIDER NOTES
"History  Chief Complaint   Patient presents with    Headache     Pt c/o HA and \"sciatic nerve pain\" since yesterday, no relief with OTC. Denies migraine hx     Patient is a 49-year-old male with past medical history of sciatica diagnosed over the past 6 months who presents with lower back pain extending into his bilateral lower extremities that began last night.  Patient is able to ambulate independently, denies any paresthesias, weakness in his lower extremities, or other associated symptoms.  Denies any trauma to the area.  Also reports a mild headache that feels like a pressure but denies any photophobia, dizziness, lightheadedness, lack of coordination, confusion, recent sick contacts, rhinorrhea, sinus congestion, cough, N/V/F/C, chest pain, or shortness of breath.  Took 400 mg ibuprofen at home without relief.      History provided by:  Patient  Headache  Associated symptoms: back pain    Associated symptoms: no abdominal pain, no cough, no dizziness, no eye pain, no fever, no nausea, no numbness, no photophobia, no sinus pressure, no sore throat, no vomiting and no weakness        Prior to Admission Medications   Prescriptions Last Dose Informant Patient Reported? Taking?   HYDROcodone-acetaminophen (NORCO) 5-325 mg per tablet   No No   Sig: Take 1 tablet by mouth every 6 (six) hours as needed for pain for up to 15 doses Max Daily Amount: 4 tablets   cyclobenzaprine (FLEXERIL) 10 mg tablet   No No   Sig: Take 1 tablet (10 mg total) by mouth 2 (two) times a day as needed for muscle spasms for up to 10 days   ondansetron (ZOFRAN) 4 mg tablet   No No   Sig: Take 1 tablet (4 mg total) by mouth every 6 (six) hours for 3 days   prednisoLONE acetate (PRED FORTE) 1 % ophthalmic suspension   No No   Sig: Administer 1 drop to both eyes 4 (four) times a day      Facility-Administered Medications: None       History reviewed. No pertinent past medical history.    Past Surgical History:   Procedure Laterality Date    " CYSTOSCOPY      Diagnostic. Last assessed 7/31/2014        Family History   Problem Relation Age of Onset    Arthritis Mother     Stroke Father     Heart disease Father     No Known Problems Sister     No Known Problems Brother     No Known Problems Son     No Known Problems Daughter     No Known Problems Sister     No Known Problems Brother      I have reviewed and agree with the history as documented.    E-Cigarette/Vaping    E-Cigarette Use Never User      E-Cigarette/Vaping Substances    Nicotine No     THC No     CBD No      Social History     Tobacco Use    Smoking status: Never    Smokeless tobacco: Never   Vaping Use    Vaping status: Never Used   Substance Use Topics    Alcohol use: No    Drug use: Never        Review of Systems   Constitutional:  Negative for chills and fever.   HENT:  Negative for sinus pressure, sinus pain and sore throat.    Eyes:  Negative for photophobia, pain and visual disturbance.   Respiratory:  Negative for cough and shortness of breath.    Cardiovascular:  Negative for chest pain.   Gastrointestinal:  Negative for abdominal pain, nausea and vomiting.   Musculoskeletal:  Positive for back pain. Negative for gait problem.   Neurological:  Positive for light-headedness and headaches. Negative for dizziness, syncope, speech difficulty, weakness and numbness.   All other systems reviewed and are negative.      Physical Exam  ED Triage Vitals   Temperature Pulse Respirations Blood Pressure SpO2   07/09/24 1512 07/09/24 1512 07/09/24 1512 07/09/24 1512 07/09/24 1512   97.8 °F (36.6 °C) 94 18 118/77 97 %      Temp Source Heart Rate Source Patient Position - Orthostatic VS BP Location FiO2 (%)   07/09/24 1512 07/09/24 1512 07/09/24 1512 07/09/24 1512 --   Oral Monitor Sitting Left arm       Pain Score       07/09/24 1514       8             Orthostatic Vital Signs  Vitals:    07/09/24 1512   BP: 118/77   Pulse: 94   Patient Position - Orthostatic VS: Sitting       Physical Exam  Vitals  and nursing note reviewed.   Constitutional:       General: He is not in acute distress.     Appearance: Normal appearance. He is well-developed.   HENT:      Head: Normocephalic and atraumatic.      Right Ear: Tympanic membrane and ear canal normal.      Left Ear: Tympanic membrane and ear canal normal.      Nose: No rhinorrhea.      Comments: Mild turbinate hypertrophy bilaterally.  No discharge.     Mouth/Throat:      Pharynx: Oropharynx is clear. No posterior oropharyngeal erythema.   Eyes:      Extraocular Movements: Extraocular movements intact.      Pupils: Pupils are equal, round, and reactive to light.      Comments: Pupils 2+ symmetric,PERRL, EOMI. mild conjunctival injection bilaterally without discharge.   Cardiovascular:      Rate and Rhythm: Normal rate and regular rhythm.      Pulses: Normal pulses.      Heart sounds: Normal heart sounds. No murmur heard.  Pulmonary:      Effort: Pulmonary effort is normal. No respiratory distress.      Breath sounds: Normal breath sounds. No rhonchi or rales.   Abdominal:      General: Bowel sounds are normal.      Palpations: Abdomen is soft.      Tenderness: There is no abdominal tenderness. There is no guarding or rebound.   Musculoskeletal:         General: No swelling or tenderness. Normal range of motion.      Cervical back: Normal range of motion and neck supple. No tenderness.      Comments: Mild paraspinal tenderness to palpation at the lumbar level.  No overlying erythema or edema.  No point tenderness at the midline.   Skin:     General: Skin is warm and dry.      Capillary Refill: Capillary refill takes less than 2 seconds.   Neurological:      General: No focal deficit present.      Mental Status: He is alert and oriented to person, place, and time.      Sensory: No sensory deficit.      Motor: No weakness.      Coordination: Coordination normal.      Comments: Cranial nerves III-XII intact.  Strength 5/5 in bilateral upper and lower extremities.   Sensation intact diffusely in upper and lower extremities.  Gait intact, no evidence of ataxia.   Psychiatric:         Mood and Affect: Mood normal.         ED Medications  Medications   ketorolac (TORADOL) injection 30 mg (30 mg Intramuscular Given 7/9/24 1652)       Diagnostic Studies  Results Reviewed       None                   No orders to display         Procedures  Procedures      ED Course                                       Medical Decision Making  Patient is a 49-year-old male with history of sciatica presenting with lower back pain extending into the bilateral lower extremities and mild headache that began last night.    Exam unremarkable, no evidence of acute intracranial process due to normal neuro exam including intact strength, sensation, gait, and lack of any other focal neurological findings.   Mild paraspinal tenderness most likely musculoskeletal in nature due to dull aching quality of the pain per the patient, as well as reproducibility on exam.  Patient drove himself to the ED today so will order IM Toradol for pain.    Patient's back pain and headache significantly improved after IM Toradol.  Reports that he feels comfortable going home with appropriate follow-up. Will provide patient with contact information for [facility] as well as instructions in Romanian to follow-up with his PCP for further evaluation of his back pain.  Patient understands and is agreeable to the plan and was discharged in stable condition.    Risk  Prescription drug management.          Disposition  Final diagnoses:   Chronic midline low back pain with bilateral sciatica     Time reflects when diagnosis was documented in both MDM as applicable and the Disposition within this note       Time User Action Codes Description Comment    7/9/2024  5:58 PM Rosy López Add [M54.41,  M54.42,  G89.29] Chronic midline low back pain with bilateral sciatica           ED Disposition       ED Disposition    Discharge    Condition   Stable    Date/Time   Tue Jul 9, 2024  5:58 PM    Comment   Charly Wallace discharge to home/self care.                   Follow-up Information       Follow up With Specialties Details Why Contact Info Additional Information    Francois Arango MD Cardiology Schedule an appointment as soon as possible for a visit on 7/10/2024  [phone] [phone]  [phone]       [facility] Physical Therapy Call on 7/9/2024  [phone]            Patient's Medications   Discharge Prescriptions    No medications on file         PDMP Review         Value Time User    PDMP Reviewed  Yes 7/9/2024  4:41 PM Trenton Juárez MD             ED Provider  Attending physically available and evaluated Charly Wallace. I managed the patient along with the ED Attending.    Electronically Signed by           Rosy López MD  07/09/24 3862

## 2024-07-09 NOTE — ED ATTENDING ATTESTATION
7/9/2024  I, Trenton Juárez MD, saw and evaluated the patient. I have discussed the patient with the resident/non-physician practitioner and agree with the resident's/non-physician practitioner's findings, Plan of Care, and MDM as documented in the resident's/non-physician practitioner's note, except where noted. All available labs and Radiology studies were reviewed.  I was present for key portions of any procedure(s) performed by the resident/non-physician practitioner and I was immediately available to provide assistance.       At this point I agree with the current assessment done in the Emergency Department.  I have conducted an independent evaluation of this patient a history and physical is as follows:    49-year-old male presented for evaluation of biparietal/frontal headache beginning last night and persisting throughout the day today as well as an exacerbation of sciatic pain down both legs.  Nuys history of similar headaches.  Stated that Tylenol, Motrin did not help last night.  No other associated symptoms such as neck pain/stiffness, fever, chills, visual changes, nausea, vomiting, photophobia.  No chest or abdominal pain.  He has no paresthesias or weakness in the legs.  Pain radiates down the posterior lateral aspect of both.  Strength and sensation equal on exam.  Negative straight leg raise.  Normal pulses.  No midline lumbar tenderness.    ED Course     Patient reported symptomatic improvement with IM Toradol in the emergency department.  Plan referral to comprehensive [facility] program for further management.  Recommending continuing NSAIDs, muscle relaxer as needed at nighttime.    Critical Care Time  Procedures

## 2024-07-10 ENCOUNTER — TELEPHONE (OUTPATIENT)
Dept: PHYSICAL THERAPY | Facility: OTHER | Age: 49
End: 2024-07-10

## 2024-07-10 NOTE — TELEPHONE ENCOUNTER
Call placed to the patient per [facility] referral.    Call went straight to voicemail.    V/m left in Irish for patient to call back. Phone number and hours of business provided.    This is the 1st attempt to reach the patient. Will defer referral per protocol.    Note: Under coverage patient has a W/C assigned.    H. I ???

## 2024-07-11 ENCOUNTER — HOSPITAL ENCOUNTER (EMERGENCY)
Facility: HOSPITAL | Age: 49
Discharge: HOME | End: 2024-07-12
Attending: EMERGENCY MEDICINE

## 2024-07-11 DIAGNOSIS — M54.9 BACK PAIN: Primary | ICD-10-CM

## 2024-07-11 PROCEDURE — 96372 THER/PROPH/DIAG INJ SC/IM: CPT

## 2024-07-11 PROCEDURE — 99284 EMERGENCY DEPT VISIT MOD MDM: CPT | Performed by: PHYSICIAN ASSISTANT

## 2024-07-11 PROCEDURE — 99284 EMERGENCY DEPT VISIT MOD MDM: CPT

## 2024-07-11 RX ORDER — LIDOCAINE 50 MG/G
1 PATCH TOPICAL ONCE
Status: DISCONTINUED | OUTPATIENT
Start: 2024-07-11 | End: 2024-07-12 | Stop reason: HOSPADM

## 2024-07-11 RX ORDER — CYCLOBENZAPRINE HCL 10 MG
10 TABLET ORAL 2 TIMES DAILY PRN
Qty: 20 TABLET | Refills: 0 | Status: SHIPPED | OUTPATIENT
Start: 2024-07-11

## 2024-07-11 RX ORDER — CYCLOBENZAPRINE HCL 10 MG
10 TABLET ORAL ONCE
Status: COMPLETED | OUTPATIENT
Start: 2024-07-12 | End: 2024-07-12

## 2024-07-11 RX ORDER — LIDOCAINE 50 MG/G
1 PATCH TOPICAL DAILY
Qty: 6 PATCH | Refills: 0 | Status: SHIPPED | OUTPATIENT
Start: 2024-07-11

## 2024-07-11 RX ORDER — KETOROLAC TROMETHAMINE 30 MG/ML
30 INJECTION, SOLUTION INTRAMUSCULAR; INTRAVENOUS ONCE
Status: COMPLETED | OUTPATIENT
Start: 2024-07-11 | End: 2024-07-11

## 2024-07-11 RX ORDER — NAPROXEN 500 MG/1
500 TABLET ORAL 2 TIMES DAILY WITH MEALS
Qty: 30 TABLET | Refills: 0 | Status: SHIPPED | OUTPATIENT
Start: 2024-07-11

## 2024-07-11 RX ADMIN — LIDOCAINE 1 PATCH: 50 PATCH CUTANEOUS at 23:39

## 2024-07-11 RX ADMIN — KETOROLAC TROMETHAMINE 30 MG: 30 INJECTION, SOLUTION INTRAMUSCULAR; INTRAVENOUS at 23:39

## 2024-07-11 NOTE — Clinical Note
Charly Nair was seen and treated in our emergency department on 7/11/2024.    No restrictions            Diagnosis:     Charly  may return to work on return date.    He may return on this date: 07/15/2024         If you have any questions or concerns, please don't hesitate to call.      Katlin De Guzman PA-C    ______________________________           _______________          _______________  Hospital Representative                              Date                                Time

## 2024-07-12 ENCOUNTER — APPOINTMENT (EMERGENCY)
Dept: CT IMAGING | Facility: HOSPITAL | Age: 49
End: 2024-07-12

## 2024-07-12 VITALS
HEART RATE: 81 BPM | RESPIRATION RATE: 18 BRPM | SYSTOLIC BLOOD PRESSURE: 121 MMHG | DIASTOLIC BLOOD PRESSURE: 86 MMHG | TEMPERATURE: 98.2 F | WEIGHT: 182 LBS | OXYGEN SATURATION: 95 % | BODY MASS INDEX: 27.9 KG/M2

## 2024-07-12 LAB
ALBUMIN SERPL BCG-MCNC: 4.5 G/DL (ref 3.5–5)
ALP SERPL-CCNC: 89 U/L (ref 34–104)
ALT SERPL W P-5'-P-CCNC: 67 U/L (ref 7–52)
ANION GAP SERPL CALCULATED.3IONS-SCNC: 9 MMOL/L (ref 4–13)
AST SERPL W P-5'-P-CCNC: 30 U/L (ref 13–39)
BASOPHILS # BLD AUTO: 0.05 THOUSANDS/ÂΜL (ref 0–0.1)
BASOPHILS NFR BLD AUTO: 1 % (ref 0–1)
BILIRUB SERPL-MCNC: 0.35 MG/DL (ref 0.2–1)
BILIRUB UR QL STRIP: NEGATIVE
BUN SERPL-MCNC: 12 MG/DL (ref 5–25)
CALCIUM SERPL-MCNC: 9.2 MG/DL (ref 8.4–10.2)
CARDIAC TROPONIN I PNL SERPL HS: <2 NG/L
CHLORIDE SERPL-SCNC: 105 MMOL/L (ref 96–108)
CLARITY UR: CLEAR
CO2 SERPL-SCNC: 25 MMOL/L (ref 21–32)
COLOR UR: NORMAL
CREAT SERPL-MCNC: 0.75 MG/DL (ref 0.6–1.3)
EOSINOPHIL # BLD AUTO: 0.16 THOUSAND/ÂΜL (ref 0–0.61)
EOSINOPHIL NFR BLD AUTO: 2 % (ref 0–6)
ERYTHROCYTE [DISTWIDTH] IN BLOOD BY AUTOMATED COUNT: 13.2 % (ref 11.6–15.1)
GFR SERPL CREATININE-BSD FRML MDRD: 106 ML/MIN/1.73SQ M
GLUCOSE SERPL-MCNC: 102 MG/DL (ref 65–140)
GLUCOSE UR STRIP-MCNC: NEGATIVE MG/DL
HCT VFR BLD AUTO: 49.4 % (ref 36.5–49.3)
HGB BLD-MCNC: 16.7 G/DL (ref 12–17)
HGB UR QL STRIP.AUTO: NEGATIVE
IMM GRANULOCYTES # BLD AUTO: 0.09 THOUSAND/UL (ref 0–0.2)
IMM GRANULOCYTES NFR BLD AUTO: 1 % (ref 0–2)
KETONES UR STRIP-MCNC: NEGATIVE MG/DL
LEUKOCYTE ESTERASE UR QL STRIP: NEGATIVE
LYMPHOCYTES # BLD AUTO: 3.09 THOUSANDS/ÂΜL (ref 0.6–4.47)
LYMPHOCYTES NFR BLD AUTO: 42 % (ref 14–44)
MCH RBC QN AUTO: 29.8 PG (ref 26.8–34.3)
MCHC RBC AUTO-ENTMCNC: 33.8 G/DL (ref 31.4–37.4)
MCV RBC AUTO: 88 FL (ref 82–98)
MONOCYTES # BLD AUTO: 0.43 THOUSAND/ÂΜL (ref 0.17–1.22)
MONOCYTES NFR BLD AUTO: 6 % (ref 4–12)
NEUTROPHILS # BLD AUTO: 3.62 THOUSANDS/ÂΜL (ref 1.85–7.62)
NEUTS SEG NFR BLD AUTO: 48 % (ref 43–75)
NITRITE UR QL STRIP: NEGATIVE
NRBC BLD AUTO-RTO: 0 /100 WBCS
PH UR STRIP.AUTO: 6 [PH]
PLATELET # BLD AUTO: 242 THOUSANDS/UL (ref 149–390)
PMV BLD AUTO: 10.6 FL (ref 8.9–12.7)
POTASSIUM SERPL-SCNC: 3.8 MMOL/L (ref 3.5–5.3)
PROT SERPL-MCNC: 7.4 G/DL (ref 6.4–8.4)
PROT UR STRIP-MCNC: NEGATIVE MG/DL
RBC # BLD AUTO: 5.6 MILLION/UL (ref 3.88–5.62)
SODIUM SERPL-SCNC: 139 MMOL/L (ref 135–147)
SP GR UR STRIP.AUTO: 1.01 (ref 1–1.04)
UROBILINOGEN UA: NEGATIVE MG/DL
WBC # BLD AUTO: 7.44 THOUSAND/UL (ref 4.31–10.16)

## 2024-07-12 PROCEDURE — 93005 ELECTROCARDIOGRAM TRACING: CPT

## 2024-07-12 PROCEDURE — 36415 COLL VENOUS BLD VENIPUNCTURE: CPT | Performed by: PHYSICIAN ASSISTANT

## 2024-07-12 PROCEDURE — 84484 ASSAY OF TROPONIN QUANT: CPT | Performed by: PHYSICIAN ASSISTANT

## 2024-07-12 PROCEDURE — 85025 COMPLETE CBC W/AUTO DIFF WBC: CPT | Performed by: PHYSICIAN ASSISTANT

## 2024-07-12 PROCEDURE — 96372 THER/PROPH/DIAG INJ SC/IM: CPT

## 2024-07-12 PROCEDURE — 74174 CTA ABD&PLVS W/CONTRAST: CPT

## 2024-07-12 PROCEDURE — 80053 COMPREHEN METABOLIC PANEL: CPT | Performed by: PHYSICIAN ASSISTANT

## 2024-07-12 PROCEDURE — 71275 CT ANGIOGRAPHY CHEST: CPT

## 2024-07-12 RX ORDER — CYCLOBENZAPRINE HCL 10 MG
10 TABLET ORAL 2 TIMES DAILY PRN
Qty: 20 TABLET | Refills: 0 | Status: SHIPPED | OUTPATIENT
Start: 2024-07-12

## 2024-07-12 RX ORDER — DIAZEPAM 5 MG/ML
5 INJECTION, SOLUTION INTRAMUSCULAR; INTRAVENOUS ONCE
Status: COMPLETED | OUTPATIENT
Start: 2024-07-12 | End: 2024-07-12

## 2024-07-12 RX ORDER — LIDOCAINE 50 MG/G
1 PATCH TOPICAL DAILY
Qty: 6 PATCH | Refills: 0 | Status: SHIPPED | OUTPATIENT
Start: 2024-07-12

## 2024-07-12 RX ORDER — NAPROXEN 500 MG/1
500 TABLET ORAL 2 TIMES DAILY WITH MEALS
Qty: 30 TABLET | Refills: 0 | Status: SHIPPED | OUTPATIENT
Start: 2024-07-12

## 2024-07-12 RX ADMIN — IOHEXOL 100 ML: 350 INJECTION, SOLUTION INTRAVENOUS at 02:37

## 2024-07-12 RX ADMIN — CYCLOBENZAPRINE HYDROCHLORIDE 10 MG: 10 TABLET, FILM COATED ORAL at 00:06

## 2024-07-12 RX ADMIN — DIAZEPAM 5 MG: 5 INJECTION, SOLUTION INTRAMUSCULAR; INTRAVENOUS at 01:06

## 2024-07-12 NOTE — DISCHARGE INSTRUCTIONS
Take medication as directed.  Do not drink or drive while taking these medications.  Follow-up with the spine center.

## 2024-07-12 NOTE — ED PROVIDER NOTES
History  Chief Complaint   Patient presents with    Back Pain     Reports sciatic back pain down his right leg. At [facility] a few days ago with relief from injection that he received. Cannot afford to go to a specialist. No pain medication prior to coming in.No numbness or tingling. Can't sit or stand for too long.     49-year-old male with history of sciatica presents complaining of chronic right-sided sciatica.  Patient states that it feels similar to prior flares.  Patient states that he cannot afford most of his medications or go to a specialist.  Patient states that he received Toradol 2 days ago at another hospital visit and that that was very helpful.  Patient denies any bowel or bladder incontinence urinary retention or saddle anesthesia.  Denies any difficulty ambulating.  States that he believes he needs another injection of the same medicine.  Denies any other complaints.      History provided by:  Patient   used: No        Prior to Admission Medications   Prescriptions Last Dose Informant Patient Reported? Taking?   HYDROcodone-acetaminophen (NORCO) 5-325 mg per tablet   No No   Sig: Take 1 tablet by mouth every 6 (six) hours as needed for pain for up to 15 doses Max Daily Amount: 4 tablets   cyclobenzaprine (FLEXERIL) 10 mg tablet   No No   Sig: Take 1 tablet (10 mg total) by mouth 2 (two) times a day as needed for muscle spasms for up to 10 days   ondansetron (ZOFRAN) 4 mg tablet   No No   Sig: Take 1 tablet (4 mg total) by mouth every 6 (six) hours for 3 days   prednisoLONE acetate (PRED FORTE) 1 % ophthalmic suspension   No No   Sig: Administer 1 drop to both eyes 4 (four) times a day      Facility-Administered Medications: None       History reviewed. No pertinent past medical history.    Past Surgical History:   Procedure Laterality Date    CYSTOSCOPY      Diagnostic. Last assessed 7/31/2014        Family History   Problem Relation Age of Onset    Arthritis Mother     Stroke  Father     Heart disease Father     No Known Problems Sister     No Known Problems Brother     No Known Problems Son     No Known Problems Daughter     No Known Problems Sister     No Known Problems Brother      I have reviewed and agree with the history as documented.    E-Cigarette/Vaping    E-Cigarette Use Never User      E-Cigarette/Vaping Substances    Nicotine No     THC No     CBD No      Social History     Tobacco Use    Smoking status: Never    Smokeless tobacco: Never   Vaping Use    Vaping status: Never Used   Substance Use Topics    Alcohol use: No    Drug use: Never       Review of Systems   Constitutional: Negative.  Negative for chills and fatigue.   HENT:  Negative for ear pain and sore throat.    Eyes:  Negative for photophobia and redness.   Respiratory:  Negative for apnea, cough and shortness of breath.    Cardiovascular:  Negative for chest pain.   Gastrointestinal:  Negative for abdominal pain, nausea and vomiting.   Genitourinary:  Negative for dysuria.   Musculoskeletal:  Positive for back pain. Negative for arthralgias, neck pain and neck stiffness.   Skin:  Negative for rash.   Neurological:  Negative for dizziness, tremors, syncope and weakness.   Psychiatric/Behavioral:  Negative for suicidal ideas.        Physical Exam  Physical Exam  Constitutional:       General: He is not in acute distress.     Appearance: He is well-developed. He is not diaphoretic.   Eyes:      Pupils: Pupils are equal, round, and reactive to light.   Cardiovascular:      Rate and Rhythm: Normal rate and regular rhythm.   Pulmonary:      Effort: Pulmonary effort is normal. No respiratory distress.      Breath sounds: Normal breath sounds.   Abdominal:      General: Bowel sounds are normal. There is no distension.      Palpations: Abdomen is soft.   Musculoskeletal:         General: Normal range of motion.      Cervical back: Normal range of motion and neck supple.      Comments: No posterior midline tenderness  vertebral malady or step-off.  Positive straight leg raise on the right.  No saddle anesthesia.   Skin:     General: Skin is warm and dry.   Neurological:      Mental Status: He is alert and oriented to person, place, and time.         Vital Signs  ED Triage Vitals   Temperature Pulse Respirations Blood Pressure SpO2   07/11/24 2315 07/11/24 2315 07/11/24 2315 07/11/24 2315 07/11/24 2315   98.2 °F (36.8 °C) 100 18 119/78 94 %      Temp Source Heart Rate Source Patient Position - Orthostatic VS BP Location FiO2 (%)   07/11/24 2315 07/11/24 2315 07/11/24 2315 07/11/24 2315 --   Oral Monitor Sitting Left arm       Pain Score       07/11/24 2339       10 - Worst Possible Pain           Vitals:    07/11/24 2315   BP: 119/78   Pulse: 100   Patient Position - Orthostatic VS: Sitting         Visual Acuity      ED Medications  Medications   lidocaine (LIDODERM) 5 % patch 1 patch (1 patch Topical Medication Applied 7/11/24 2339)   ketorolac (TORADOL) injection 30 mg (30 mg Intramuscular Given 7/11/24 2339)   cyclobenzaprine (FLEXERIL) tablet 10 mg (10 mg Oral Given 7/12/24 0006)   diazepam (VALIUM) injection 5 mg (5 mg Intramuscular Given 7/12/24 0106)   iohexol (OMNIPAQUE) 350 MG/ML injection (SINGLE-DOSE) 100 mL (100 mL Intravenous Given 7/12/24 0237)       Diagnostic Studies  Results Reviewed       Procedure Component Value Units Date/Time    HS Troponin I 2hr [[id number]]     Lab Status: No result Specimen: Blood     HS Troponin 0hr (reflex protocol) [[id number]]  (Normal) Collected: 07/12/24 0143    Lab Status: Final result Specimen: Blood from Arm, Left Updated: 07/12/24 0211     hs TnI 0hr <2 ng/L     Comprehensive metabolic panel [[id number]]  (Abnormal) Collected: 07/12/24 0143    Lab Status: Final result Specimen: Blood from Arm, Left Updated: 07/12/24 0206     Sodium 139 mmol/L      Potassium 3.8 mmol/L      Chloride 105 mmol/L      CO2 25 mmol/L      ANION GAP 9 mmol/L      BUN 12 mg/dL      Creatinine 0.75 mg/dL       Glucose 102 mg/dL      Calcium 9.2 mg/dL      AST 30 U/L      ALT 67 U/L      Alkaline Phosphatase 89 U/L      Total Protein 7.4 g/dL      Albumin 4.5 g/dL      Total Bilirubin 0.35 mg/dL      eGFR 106 ml/min/1.73sq m     Narrative:      National Kidney Disease Foundation guidelines for Chronic Kidney Disease (CKD):     Stage 1 with normal or high GFR (GFR > 90 mL/min/1.73 square meters)    Stage 2 Mild CKD (GFR = 60-89 mL/min/1.73 square meters)    Stage 3A Moderate CKD (GFR = 45-59 mL/min/1.73 square meters)    Stage 3B Moderate CKD (GFR = 30-44 mL/min/1.73 square meters)    Stage 4 Severe CKD (GFR = 15-29 mL/min/1.73 square meters)    Stage 5 End Stage CKD (GFR <15 mL/min/1.73 square meters)  Note: GFR calculation is accurate only with a steady state creatinine    CBC and differential [[id number]]  (Abnormal) Collected: 07/12/24 0143    Lab Status: Final result Specimen: Blood from Arm, Left Updated: 07/12/24 0150     WBC 7.44 Thousand/uL      RBC 5.60 Million/uL      Hemoglobin 16.7 g/dL      Hematocrit 49.4 %      MCV 88 fL      MCH 29.8 pg      MCHC 33.8 g/dL      RDW 13.2 %      MPV 10.6 fL      Platelets 242 Thousands/uL      nRBC 0 /100 WBCs      Segmented % 48 %      Immature Grans % 1 %      Lymphocytes % 42 %      Monocytes % 6 %      Eosinophils Relative 2 %      Basophils Relative 1 %      Absolute Neutrophils 3.62 Thousands/µL      Absolute Immature Grans 0.09 Thousand/uL      Absolute Lymphocytes 3.09 Thousands/µL      Absolute Monocytes 0.43 Thousand/µL      Eosinophils Absolute 0.16 Thousand/µL      Basophils Absolute 0.05 Thousands/µL     UA (URINE) with reflex to Scope [[id number]]  (Normal) Collected: 07/12/24 0049    Lab Status: Final result Specimen: Urine, Clean Catch Updated: 07/12/24 0057     Color, UA Straw     Clarity, UA Clear     Specific Gravity, UA 1.010     pH, UA 6.0     Leukocytes, UA Negative     Nitrite, UA Negative     Protein, UA Negative mg/dl      Glucose, UA Negative mg/dl       Ketones, UA Negative mg/dl      Bilirubin, UA Negative     Occult Blood, UA Negative     UROBILINOGEN UA Negative mg/dL                    CTA dissection protocol chest/abdomen/pelvis   Final Result by Carlos Lin MD (07/12 0317)      No thoracoabdominal aortic aneurysm or dissection. No evidence for acute pulmonary embolism. No significant acute abnormalities in the chest, abdomen, or pelvis identified.         Workstation performed: [id number]                    Procedures  ECG 12 Lead Documentation Only    Date/Time: 7/12/2024 2:51 AM    Performed by: Katlin De Guzman PA-C  Authorized by: Katlin De Guzman PA-C    Indications / Diagnosis:  Back pain  ECG reviewed by me, the ED Provider: yes    Patient location:  ED  Interpretation:     Interpretation: normal    Rate:     ECG rate:  79    ECG rate assessment: normal    Ectopy:     Ectopy: none    QRS:     QRS axis:  Normal    QRS intervals:  Normal  Conduction:     Conduction: normal    ST segments:     ST segments:  Normal  T waves:     T waves: normal             ED Course                                 SBIRT 22yo+      Flowsheet Row Most Recent Value   Initial Alcohol Screen: US AUDIT-C     1. How often do you have a drink containing alcohol? 0 Filed at: 07/11/2024 2314   2. How many drinks containing alcohol do you have on a typical day you are drinking?  0 Filed at: 07/11/2024 2314   3a. Male UNDER 65: How often do you have five or more drinks on one occasion? 0 Filed at: 07/11/2024 2314   3b. FEMALE Any Age, or MALE 65+: How often do you have 4 or more drinks on one occassion? 0 Filed at: 07/11/2024 2314   Audit-C Score 0 Filed at: 07/11/2024 2314   TAVO: How many times in the past year have you...    Used an illegal drug or used a prescription medication for non-medical reasons? Never Filed at: 07/11/2024 2314                      Medical Decision Making  Patient is afebrile, denies IVDA, bladder/bowel incontinence, urinary retention, perianal  numbness and is not immunocompromised. No clinical evidence of epidural abscess or cauda equina syndrome at this time. Patient was apprised of red flag symptoms and reasons to return to emergency department.  Patient has history of sciatica with history and physical exam consistent with similar today however after multiple rounds of medication that have previously been successful for the patient and patient continued to complain of severe pain.  At this time shared decision-making discussion was had with the patient regarding further evaluation.  Patient demonstrates understanding and agrees to further evaluation including laboratory evaluation and CAT scan.  Laboratory evaluation and CAT scan without other focal cause of symptoms today.  Patient ultimately had some relief with medications and was given prescriptions for similar at home.  Educated on supportive care and return precautions.  Given referral to spine center.  Discharged home.  Ambulated at the department.         Amount and/or Complexity of Data Reviewed  Labs: ordered.  Radiology: ordered.    Risk  Prescription drug management.                 Disposition  Final diagnoses:   Back pain     Time reflects when diagnosis was documented in both MDM as applicable and the Disposition within this note       Time User Action Codes Description Comment    7/11/2024 11:53 PM Katlin De Guzman Add [M54.9] Back pain           ED Disposition       ED Disposition   Discharge    Condition   Stable    Date/Time   Thu Jul 11, 2024 0264    Comment   Charly Nair discharge to home/self care.                   Follow-up Information       Follow up With Specialties Details Why Contact Info Additional Information    [facility] Emergency Department Emergency Medicine Go to  If symptoms worsen [address] [facility] [address] [phone] [facility] Emergency Department    [facility] Spine  Program Physical Therapy Call in 3 days As needed [phone]            Patient's Medications   Discharge Prescriptions    CYCLOBENZAPRINE (FLEXERIL) 10 MG TABLET    Take 1 tablet (10 mg total) by mouth 2 (two) times a day as needed for muscle spasms       Start Date: 7/11/2024 End Date: --       Order Dose: 10 mg       Quantity: 20 tablet    Refills: 0    CYCLOBENZAPRINE (FLEXERIL) 10 MG TABLET    Take 1 tablet (10 mg total) by mouth 2 (two) times a day as needed for muscle spasms       Start Date: 7/12/2024 End Date: --       Order Dose: 10 mg       Quantity: 20 tablet    Refills: 0    DICLOFENAC SODIUM (VOLTAREN) 1 %    Apply 2 g topically 4 (four) times a day       Start Date: 7/12/2024 End Date: --       Order Dose: 2 g       Quantity: 20 g    Refills: 0    LIDOCAINE (LIDODERM) 5 %    Apply 1 patch topically over 12 hours daily Remove & Discard patch within 12 hours or as directed by MD       Start Date: 7/11/2024 End Date: --       Order Dose: 1 patch       Quantity: 6 patch    Refills: 0    LIDOCAINE (LIDODERM) 5 %    Apply 1 patch topically over 12 hours daily Remove & Discard patch within 12 hours or as directed by MD       Start Date: 7/12/2024 End Date: --       Order Dose: 1 patch       Quantity: 6 patch    Refills: 0    NAPROXEN (NAPROSYN) 500 MG TABLET    Take 1 tablet (500 mg total) by mouth 2 (two) times a day with meals       Start Date: 7/11/2024 End Date: --       Order Dose: 500 mg       Quantity: 30 tablet    Refills: 0    NAPROXEN (NAPROSYN) 500 MG TABLET    Take 1 tablet (500 mg total) by mouth 2 (two) times a day with meals       Start Date: 7/12/2024 End Date: --       Order Dose: 500 mg       Quantity: 30 tablet    Refills: 0       No discharge procedures on file.    PDMP Review         Value Time User    PDMP Reviewed  Yes 7/9/2024  4:41 PM Trenton Juárez MD            ED Provider  Electronically Signed by             Katlin De Guzman PA-C  07/12/24 0358

## 2024-07-13 LAB
ATRIAL RATE: 79 BPM
P AXIS: 62 DEGREES
PR INTERVAL: 178 MS
QRS AXIS: 42 DEGREES
QRSD INTERVAL: 92 MS
QT INTERVAL: 378 MS
QTC INTERVAL: 433 MS
T WAVE AXIS: 26 DEGREES
VENTRICULAR RATE: 79 BPM

## 2024-07-13 PROCEDURE — 93010 ELECTROCARDIOGRAM REPORT: CPT | Performed by: INTERNAL MEDICINE

## 2025-01-09 ENCOUNTER — TELEPHONE (OUTPATIENT)
Age: 50
End: 2025-01-09

## 2025-01-09 ENCOUNTER — PATIENT OUTREACH (OUTPATIENT)
Dept: INTERNAL MEDICINE CLINIC | Facility: CLINIC | Age: 50
End: 2025-01-09

## 2025-01-09 ENCOUNTER — OFFICE VISIT (OUTPATIENT)
Dept: INTERNAL MEDICINE CLINIC | Facility: CLINIC | Age: 50
End: 2025-01-09

## 2025-01-09 VITALS
HEART RATE: 90 BPM | OXYGEN SATURATION: 98 % | HEIGHT: 67 IN | TEMPERATURE: 98 F | WEIGHT: 178.6 LBS | BODY MASS INDEX: 28.03 KG/M2 | SYSTOLIC BLOOD PRESSURE: 119 MMHG | DIASTOLIC BLOOD PRESSURE: 85 MMHG

## 2025-01-09 DIAGNOSIS — Z23 ENCOUNTER FOR IMMUNIZATION: ICD-10-CM

## 2025-01-09 DIAGNOSIS — R30.0 DYSURIA: Primary | ICD-10-CM

## 2025-01-09 DIAGNOSIS — Z13.31 POSITIVE DEPRESSION SCREENING: ICD-10-CM

## 2025-01-09 PROBLEM — E66.3 OVERWEIGHT (BMI 25.0-29.9): Chronic | Status: RESOLVED | Noted: 2019-05-02 | Resolved: 2025-01-09

## 2025-01-09 PROCEDURE — 90750 HZV VACC RECOMBINANT IM: CPT

## 2025-01-09 PROCEDURE — 90471 IMMUNIZATION ADMIN: CPT

## 2025-01-09 PROCEDURE — 99204 OFFICE O/P NEW MOD 45 MIN: CPT | Performed by: PHYSICIAN ASSISTANT

## 2025-01-09 PROCEDURE — 90472 IMMUNIZATION ADMIN EACH ADD: CPT

## 2025-01-09 PROCEDURE — 90673 RIV3 VACCINE NO PRESERV IM: CPT

## 2025-01-09 NOTE — PROGRESS NOTES
Name: Charly Nair      : 1975      MRN: [id number]  Encounter Provider: Amy Sin PA-C  Encounter Date: 2025   Encounter department: [facility]  :  Assessment & Plan  Dysuria  Will evaluate further with labs to rule out infection. Recommend seeing a urologist as this is an ongoing issue. He was agreeable. ER precautions given.   Orders:    UA w Reflex to Microscopic w Reflex to Culture; Future    Chlamydia/GC amplified DNA by PCR; Future    Trichomonas vaginalis/Mycoplasma genitalium PCR; Future    Ambulatory Referral to Urology; Future    Encounter for immunization  Received influenza and Shingrix immunizations. Tolerated well.   Orders:    influenza vaccine, recombinant, PF, 0.5 mL IM (Flublok)    Zoster Vaccine Recombinant IM    Positive depression screening  Patient's symptoms due to current medical issues as well as financial strain and housing. Declines BH and medications. Agreeable to meet with  to see what assistance may be provided. Met with Tracie after our visit.   Orders:    Ambulatory Referral to Social Work Care Management Program; Future          Depression Screening and Follow-up Plan: Patient's depression screening was positive with a PHQ-2 score of 6. Their PHQ-9 score was 24.   Patient assessed for underlying major depression. Brief counseling provided and recommend additional follow-up/re-evaluation next office visit. Continue regular follow-up with their mental health provider who is managing their mental health condition(s).     History of Present Illness     Patient is a 49 year old male presenting after walking requesting an appointment. He was last here 5 years ago. He states he has been struggling with a bladder/prostate issue for quite a few years, he is unsure how many. States he will get lower abdominal discomfort, dysuria, and difficulty urinating. He really has to strain to go, then he will have a lot of white  discharge and he feels better. This happens every 1-2 months. States it just happened over the last week. He has no symptoms today. Denies dysuria, frequency, or urgency. Denies hematuria. Denies rash. Denies risk for STD. States he has not been sexually active in a long time. States about 2 years ago he went to [place] and saw a doctor there. hospitals they did a cystoscopy and told him he had an infection. He is not sure what type, states they may have said sexually transmitted. hospitals he was given medication. States he continued to have the same issue. hospitals he did a telehealth visit recently with a doctor in [place]. They told him he needs another cystoscopy. He is very frustrated and states this is leading him to feel depressed. hospitals this contributed to his marriage ending. He has also been struggling with bills. He was homeless, but states he does not have a place to stay.   Use of  services was utilized during visit.       Review of Systems   Constitutional:  Negative for appetite change, chills, fatigue and fever.   Respiratory:  Negative for cough, shortness of breath and wheezing.    Cardiovascular:  Negative for chest pain, palpitations and leg swelling.   Gastrointestinal:  Positive for abdominal pain. Negative for abdominal distention, blood in stool, constipation, diarrhea, nausea and vomiting.   Endocrine: Negative for polydipsia, polyphagia and polyuria.   Genitourinary:  Positive for difficulty urinating, dysuria and penile discharge. Negative for decreased urine volume, frequency, genital sores, hematuria, penile pain, penile swelling, scrotal swelling, testicular pain and urgency.   Musculoskeletal:  Negative for arthralgias and myalgias.   Skin:  Negative for rash.   Neurological:  Negative for dizziness, weakness, light-headedness and headaches.   Hematological:  Negative for adenopathy.   Psychiatric/Behavioral:  Positive for dysphoric mood. Negative for self-injury, sleep  "disturbance and suicidal ideas. The patient is not nervous/anxious.        Objective   /85 (BP Location: Left arm, Patient Position: Sitting, Cuff Size: Standard)   Pulse 90   Temp 98 °F (36.7 °C) (Temporal)   Ht 5' 7\" (1.702 m)   Wt 81 kg (178 lb 9.6 oz)   SpO2 98%   BMI 27.97 kg/m²      Physical Exam  Vitals and nursing note reviewed.   Constitutional:       General: He is awake. He is not in acute distress.     Appearance: Normal appearance. He is well-developed, well-groomed and overweight. He is not ill-appearing.   HENT:      Head: Normocephalic and atraumatic.   Eyes:      General: No scleral icterus.     Conjunctiva/sclera: Conjunctivae normal.   Cardiovascular:      Rate and Rhythm: Normal rate and regular rhythm.      Pulses: Normal pulses.      Heart sounds: Normal heart sounds.   Pulmonary:      Effort: Pulmonary effort is normal. No respiratory distress.      Breath sounds: Normal breath sounds and air entry. No decreased air movement. No decreased breath sounds, wheezing, rhonchi or rales.   Abdominal:      General: Abdomen is flat. Bowel sounds are normal. There is no distension.      Palpations: Abdomen is soft. There is no mass.      Tenderness: There is no abdominal tenderness. There is no right CVA tenderness, left CVA tenderness, guarding or rebound.      Hernia: No hernia is present.      Comments: Declined  exam   Musculoskeletal:         General: Normal range of motion.      Cervical back: Neck supple.      Right lower leg: No edema.      Left lower leg: No edema.   Lymphadenopathy:      Cervical: No cervical adenopathy.   Skin:     General: Skin is warm.      Coloration: Skin is not jaundiced.      Findings: No rash.   Neurological:      General: No focal deficit present.      Mental Status: He is alert and oriented to person, place, and time. Mental status is at baseline.      Motor: Motor function is intact.      Coordination: Coordination is intact.      Gait: Gait is intact. "   Psychiatric:         Attention and Perception: Attention normal.         Mood and Affect: Mood is depressed. Affect is tearful.         Behavior: Behavior normal. Behavior is cooperative.       Administrative Statements   I have spent a total time of 30 minutes in caring for this patient on the day of the visit/encounter including Diagnostic results, Prognosis, Risks and benefits of tx options, Instructions for management, Patient and family education, Importance of tx compliance, Risk factor reductions, Impressions, Counseling / Coordination of care, Reviewing / ordering tests, medicine, procedures  , and Obtaining or reviewing history  . Topics discussed with the patient / family include symptom assessment and management, medication review, goals of care, and supportive listening.  Depression Screening Follow-up Plan: Patient's depression screening was positive with a PHQ-2 score of 6. Their PHQ-9 score was 24. Patient assessed for underlying major depression. They have no active suicidal ideations. Brief counseling provided and recommend additional follow-up/re-evaluation next office visit. Patient's depressive symptoms likely due to other medical condition. Would recommend treatment of underlying condition. Will continue to monitor at next office visit.

## 2025-01-09 NOTE — PROGRESS NOTES
SW has met with pt as per PCP request to assist with Community Resources as possible.  SW has spoken via  ID # [id number].  Pt has been assess for underlying major depression.    SW has asked if he has any SI/HI and he denies same.  He knows he must be strong to care for his children.  He shares that he will start Therapy with a psychologist in [place] on Saturday.  SW has strongly encouraged same.    Pt will need to pay out of pocket,  Unfortunately there is no [facility] Funding for OP MH for those ineligible for ongoing MA.  Pt is aware.  SW has provide him [facility] Crisis # which he indicated he would use if needed,  SW also provided a list of additional OP MH Resources but unfortunately they would be out of pocket.      Pt is originally from [place] and now resides here.    He has 3 children ages 4,6,and 8 .   They live with their Mother with whom he is  from.   He shares he help them as much as possible and pays the rent for the children's home.    He also provides a lot of care to his 9 y/o dgt.  They have Insurance  He does share he works a lot .   He has a [employer] company but has a lot of debt.  He does share he is up to date with his rent.    Pt is not eligible for ON going MA/LAUREN.   SW has reviewed the [facility] Financial Assistance Programs and  strongly encouraged him to apply for same.   Contact info provided and pt indicated he would.    EVERETTE has also provided him info re [employer] who also provide referral to  if interested.  EVERETTE has also provided pt info from Find Help re [employer] and other financial support programs from [facility].    Patient does not have any further questions, concerns, or other needs at this time.  Patient has my contact # and PCP office # if needed.  Social Work to remain available to assist as indicated.    Please re-consult Social Work if needed.

## 2025-01-09 NOTE — TELEPHONE ENCOUNTER
New Patient    What is the reason for the patient's appointment?: Dysuria    What office location does the patient prefer?: [facility] or [facility]    Does patient have Imaging/Lab Results:  No - labs ordered 1/9/25    INSURANCE:   Do we accept the patient's insurance or is the patient Self-Pay?: Self Pay    HISTORY:   Has the patient had any previous Urologist(s)?:  2015     Was the patient seen in the ED?: No    Does the patient have a personal history of cancer?: N/A    Appointment scheduled for 1/29/25 with PERNELL [place]. Pt on wait list.

## 2025-01-10 ENCOUNTER — APPOINTMENT (OUTPATIENT)
Dept: LAB | Facility: CLINIC | Age: 50
End: 2025-01-10

## 2025-01-10 DIAGNOSIS — R30.0 DYSURIA: ICD-10-CM

## 2025-01-10 LAB
BACTERIA UR QL AUTO: ABNORMAL /HPF
BILIRUB UR QL STRIP: NEGATIVE
CLARITY UR: ABNORMAL
COLOR UR: ABNORMAL
GLUCOSE UR STRIP-MCNC: NEGATIVE MG/DL
HGB UR QL STRIP.AUTO: NEGATIVE
KETONES UR STRIP-MCNC: NEGATIVE MG/DL
LEUKOCYTE ESTERASE UR QL STRIP: ABNORMAL
MUCOUS THREADS UR QL AUTO: ABNORMAL
NITRITE UR QL STRIP: NEGATIVE
NON-SQ EPI CELLS URNS QL MICRO: ABNORMAL /HPF
PH UR STRIP.AUTO: 6 [PH]
PROT UR STRIP-MCNC: ABNORMAL MG/DL
RBC #/AREA URNS AUTO: ABNORMAL /HPF
SP GR UR STRIP.AUTO: 1.03 (ref 1–1.03)
UROBILINOGEN UR STRIP-ACNC: <2 MG/DL
WBC #/AREA URNS AUTO: ABNORMAL /HPF

## 2025-01-10 PROCEDURE — 87563 M. GENITALIUM AMP PROBE: CPT

## 2025-01-10 PROCEDURE — 87661 TRICHOMONAS VAGINALIS AMPLIF: CPT

## 2025-01-10 PROCEDURE — 87491 CHLMYD TRACH DNA AMP PROBE: CPT

## 2025-01-10 PROCEDURE — 81001 URINALYSIS AUTO W/SCOPE: CPT

## 2025-01-10 PROCEDURE — 87591 N.GONORRHOEAE DNA AMP PROB: CPT

## 2025-01-11 LAB
C TRACH DNA SPEC QL NAA+PROBE: NEGATIVE
N GONORRHOEA DNA SPEC QL NAA+PROBE: NEGATIVE

## 2025-01-13 ENCOUNTER — RESULTS FOLLOW-UP (OUTPATIENT)
Dept: INTERNAL MEDICINE CLINIC | Facility: CLINIC | Age: 50
End: 2025-01-13

## 2025-01-13 LAB
M GENITALIUM DNA SPEC QL NAA+PROBE: NEGATIVE
T VAGINALIS DNA SPEC QL NAA+PROBE: NEGATIVE

## 2025-01-14 ENCOUNTER — PATIENT OUTREACH (OUTPATIENT)
Dept: INTERNAL MEDICINE CLINIC | Facility: CLINIC | Age: 50
End: 2025-01-14

## 2025-01-14 DIAGNOSIS — Z78.9 NEEDS ASSISTANCE WITH COMMUNITY RESOURCES: Primary | ICD-10-CM

## 2025-01-14 NOTE — PROGRESS NOTES
SW took an Urgent call from Clinical team for pt who is very emotional due to family issues.  Pt is not able to see his 3 children as often as he wants.    He denies any SI / HI he said never.  SW has provided the Crisis # [phone].   Pt did share he went to the Therapist in [place] over the weekend but he will not be able to afford it on going.  PAUL has reached out to [facility] [phone]  to see if they are offering any free service but had to leave a message.    Paul has also reviewed the [facility] [place] [id number].  They provided Primary Care and Mental Health Care.  Pt receptive to referral to them .     PAUL assisted with a call to their MH office and left a message for them to call pt.    Paul also called there Admistrative office and spoke to Saima. She shsares they had info on pt from 2020 but he never establishjed with them.    She spoke directly to pt and was able to schedule an appointment for THIS Friday 1/17/25 @ 9:45 AM.  Pt is satisfied with this plan.    He is aware that they will be come his Primary Care Office as well.  Saima will reach put to pt later today to confirm ans TEXT him there address # and Address.    Patient does not have any further questions, concerns, or other needs at this time.  Patient has my contact # and PCP office # if needed

## 2025-01-29 ENCOUNTER — CONSULT (OUTPATIENT)
Dept: UROLOGY | Facility: AMBULATORY SURGERY CENTER | Age: 50
End: 2025-01-29

## 2025-01-29 VITALS
BODY MASS INDEX: 26.84 KG/M2 | WEIGHT: 171 LBS | DIASTOLIC BLOOD PRESSURE: 82 MMHG | OXYGEN SATURATION: 99 % | HEART RATE: 80 BPM | SYSTOLIC BLOOD PRESSURE: 126 MMHG | HEIGHT: 67 IN

## 2025-01-29 DIAGNOSIS — N50.812 PAIN IN BOTH TESTICLES: ICD-10-CM

## 2025-01-29 DIAGNOSIS — N50.811 PAIN IN BOTH TESTICLES: ICD-10-CM

## 2025-01-29 DIAGNOSIS — Z12.5 SCREENING FOR PROSTATE CANCER: ICD-10-CM

## 2025-01-29 DIAGNOSIS — R36.9 PENILE DISCHARGE: Primary | ICD-10-CM

## 2025-01-29 DIAGNOSIS — R30.0 DYSURIA: ICD-10-CM

## 2025-01-29 PROCEDURE — 99203 OFFICE O/P NEW LOW 30 MIN: CPT

## 2025-01-29 NOTE — ASSESSMENT & PLAN NOTE
Patient reports that he has been having penile discharge and rectal discharge for the last 15 to 20 years.  He states that it is clear in nature.  He had STI testing that was all negative and unremarkable.  He also had urine studies done which demonstrated 2-4 WBCs and mucus threads.  The UA did not reflex to culture.    He does report that approximately 30 years ago he was diagnosed with gonorrhea and was adequately treated for this.  His most recent gonorrhea testing was negative.  It is possible that he developed chronic prostatitis from this STI and that is why he has been having these ongoing symptoms for several years.  Chronic prostatitis can cause expressed prostatic secretions which may be contributing to the drainage that he is experiencing.  I do not believe that his prostatitis is bacterial/acute in nature as he has not had any fever/chills, significant perineal pain and there was no pain elicited with prostate palpation.  Besides the discharge he reports that he will occasionally have testicular pain.  Otherwise he denies any other significant pain or discomfort.    He is adamant that he get a repeat cystoscopy to further evaluate if there is anything internally going on that is causing these issues.  I recommend that he get a cystoscopy with Dr. Kay which will help with the [place] language barrier.    In the meantime I would like him to get a scrotal and testicular ultrasound to see if there is any abnormalities that could be contributing to the penile discharge.  I will call him with these results.    Orders:    US scrotum and testicles; Future

## 2025-01-29 NOTE — PROGRESS NOTES
Name: Charly Nair      : 1975      MRN: [id number]  Encounter Provider: LUIS Witt  Encounter Date: 2025   Encounter department: [facility] [place]  :  Assessment & Plan  Penile discharge  Patient reports that he has been having penile discharge and rectal discharge for the last 15 to 20 years.  He states that it is clear in nature.  He had STI testing that was all negative and unremarkable.  He also had urine studies done which demonstrated 2-4 WBCs and mucus threads.  The UA did not reflex to culture.    He does report that approximately 30 years ago he was diagnosed with gonorrhea and was adequately treated for this.  His most recent gonorrhea testing was negative.  It is possible that he developed chronic prostatitis from this STI and that is why he has been having these ongoing symptoms for several years.  Chronic prostatitis can cause expressed prostatic secretions which may be contributing to the drainage that he is experiencing.  I do not believe that his prostatitis is bacterial/acute in nature as he has not had any fever/chills, significant perineal pain and there was no pain elicited with prostate palpation.  Besides the discharge he reports that he will occasionally have testicular pain.  Otherwise he denies any other significant pain or discomfort.    He is adamant that he get a repeat cystoscopy to further evaluate if there is anything internally going on that is causing these issues.  I recommend that he get a cystoscopy with Dr. Kay which will help with the [place] language barrier.    In the meantime I would like him to get a scrotal and testicular ultrasound to see if there is any abnormalities that could be contributing to the penile discharge.  I will call him with these results.    Orders:    US scrotum and testicles; Future    Screening for prostate cancer  No PSAs on file to review.  Recommend getting a PSA checked now for a  baseline.  He denies any strong family history of prostate cancer.  PEPPER in office today smooth with no appreciable nodules or masses, 30 g.  Orders:    PSA Total, Diagnostic; Future    Pain in both testicles  Patient does report that he has pain in both testicles typically when he does activity or stands for long periods of time.  Physical exam today does reveal potentially small hydroceles bilaterally.  I recommend getting a scrotal and testicular ultrasound for further assessment of what could be contributing to his pain and discomfort as well as his penile discharge.  Orders:    US scrotum and testicles; Future        History of Present Illness   Charly Nair is a 49 y.o. male who presents today to the office as a new patient for further evaluation of penile discharge.  A  was used during the entire office visit.    The patient reports that he has clear discharge from the tip of the penis and from the rectum typically once a month.  He states that approximately 30 years ago he was diagnosed with gonorrhea which was adequately treated but since that time he has had the discharge.  He states that several years ago he did have a cystoscopy and he was told that he had an infection but it is unclear what infection he was diagnosed with.    He does report that he will occasionally have testicular pain but this is typically related to when he is on his feet or does activity for long periods of time.    He denies any dysuria, hematuria, weaker urinary stream, urinary urgency/frequency, significant perineal pain, fever/chills, hematospermia.    He denies any family history of prostate cancer.    Today in the office he is extremely upset and tearful regarding this issue. He states that this has affected his marriage and him and his wife are now not together any longer because of it.  He states that he has a son that he has not been able to see since they have been .  He states that the  "issue is causing him depression and he was referred to a psychologist by his PCP.  He states that he needs to have another cystoscopy done to see if there is anything abnormal going on in internally.  He believes that if he has a cystoscopy done it will provide him with more reassurance.    Review of Systems   Constitutional:  Negative for chills and fever.   Respiratory: Negative.  Negative for cough and shortness of breath.    Cardiovascular: Negative.  Negative for chest pain.   Gastrointestinal: Negative.  Negative for abdominal distention, abdominal pain, nausea and vomiting.   Genitourinary:  Positive for penile discharge and testicular pain. Negative for decreased urine volume, difficulty urinating, dysuria, flank pain, frequency, hematuria, penile pain, penile swelling, scrotal swelling and urgency.   Skin: Negative.  Negative for rash.   Neurological: Negative.    Hematological:  Negative for adenopathy. Does not bruise/bleed easily.          Objective   /82 (BP Location: Left arm, Patient Position: Sitting, Cuff Size: Standard)   Pulse 80   Ht 5' 7\" (1.702 m)   Wt 77.6 kg (171 lb)   SpO2 99%   BMI 26.78 kg/m²     Physical Exam  Vitals reviewed.   Constitutional:       Appearance: Normal appearance.   HENT:      Head: Normocephalic and atraumatic.   Eyes:      Pupils: Pupils are equal, round, and reactive to light.   Cardiovascular:      Rate and Rhythm: Normal rate.   Pulmonary:      Effort: Pulmonary effort is normal.   Genitourinary:     Penis: Normal.       Testes: Normal.      Comments: PEPPER in office today smooth with no appreciable nodules or masses  Musculoskeletal:      Cervical back: Normal range of motion.   Skin:     General: Skin is warm and dry.   Neurological:      General: No focal deficit present.      Mental Status: He is alert and oriented to person, place, and time.   Psychiatric:         Mood and Affect: Mood normal.         Behavior: Behavior normal.         Thought Content: " "Thought content normal.         Judgment: Judgment normal.          Results  No results found for: \"PSA\"  Lab Results   Component Value Date    GLUCOSE 100 07/31/2014    CALCIUM 9.2 07/12/2024     07/31/2014    K 3.8 07/12/2024    CO2 25 07/12/2024     07/12/2024    BUN 12 07/12/2024    CREATININE 0.75 07/12/2024     Lab Results   Component Value Date    WBC 7.44 07/12/2024    HGB 16.7 07/12/2024    HCT 49.4 (H) 07/12/2024    MCV 88 07/12/2024     07/12/2024       Office Urine Dip  No results found for this or any previous visit (from the past hour).]      "

## 2025-03-10 ENCOUNTER — TELEPHONE (OUTPATIENT)
Dept: INTERNAL MEDICINE CLINIC | Facility: CLINIC | Age: 50
End: 2025-03-10

## 2025-03-18 ENCOUNTER — OFFICE VISIT (OUTPATIENT)
Dept: INTERNAL MEDICINE CLINIC | Facility: CLINIC | Age: 50
End: 2025-03-18

## 2025-03-18 ENCOUNTER — TELEPHONE (OUTPATIENT)
Age: 50
End: 2025-03-18

## 2025-03-18 VITALS
HEIGHT: 68 IN | DIASTOLIC BLOOD PRESSURE: 78 MMHG | OXYGEN SATURATION: 99 % | BODY MASS INDEX: 27.01 KG/M2 | SYSTOLIC BLOOD PRESSURE: 126 MMHG | WEIGHT: 178.2 LBS | TEMPERATURE: 97.9 F | HEART RATE: 79 BPM

## 2025-03-18 DIAGNOSIS — R53.82 CHRONIC FATIGUE: ICD-10-CM

## 2025-03-18 DIAGNOSIS — Z00.00 ANNUAL PHYSICAL EXAM: Primary | ICD-10-CM

## 2025-03-18 DIAGNOSIS — Z12.12 SCREENING FOR COLORECTAL CANCER: ICD-10-CM

## 2025-03-18 DIAGNOSIS — Z13.1 SCREENING FOR DIABETES MELLITUS: ICD-10-CM

## 2025-03-18 DIAGNOSIS — Z13.9 SCREENING DUE: ICD-10-CM

## 2025-03-18 DIAGNOSIS — Z12.11 SCREENING FOR COLORECTAL CANCER: ICD-10-CM

## 2025-03-18 DIAGNOSIS — Z13.6 SCREENING FOR CARDIOVASCULAR CONDITION: ICD-10-CM

## 2025-03-18 DIAGNOSIS — Z23 NEED FOR COVID-19 VACCINE: ICD-10-CM

## 2025-03-18 DIAGNOSIS — Z23 ENCOUNTER FOR IMMUNIZATION: ICD-10-CM

## 2025-03-18 PROCEDURE — 99396 PREV VISIT EST AGE 40-64: CPT | Performed by: STUDENT IN AN ORGANIZED HEALTH CARE EDUCATION/TRAINING PROGRAM
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
## 2025-03-18 NOTE — ASSESSMENT & PLAN NOTE
Orders:    Ambulatory referral to Gastroenterology; Future    Lipid Panel with Direct LDL reflex; Future    Hemoglobin A1C; Future    CBC and differential; Future    Comprehensive metabolic panel; Future    Vitamin D 25 hydroxy; Future    TSH + Free T4; Future

## 2025-03-18 NOTE — PATIENT INSTRUCTIONS
"Patient Education     Routine physical for adults   The Basics   Written by the doctors and editors at [facility]   What is a physical? -- A physical is a routine visit, or \"check-up,\" with your doctor. You might also hear it called a \"wellness visit\" or \"preventive visit.\"  During each visit, the doctor will:   Ask about your physical and mental health   Ask about your habits, behaviors, and lifestyle   Do an exam   Give you vaccines if needed   Talk to you about any medicines you take   Give advice about your health   Answer your questions  Getting regular check-ups is an important part of taking care of your health. It can help your doctor find and treat any problems you have. But it's also important for preventing health problems.  A routine physical is different from a \"sick visit.\" A sick visit is when you see a doctor because of a health concern or problem. Since physicals are scheduled ahead of time, you can think about what you want to ask the doctor.  How often should I get a physical? -- It depends on your age and health. In general, for people age 21 years and older:   If you are younger than 50 years, you might be able to get a physical every 3 years.   If you are 50 years or older, your doctor might recommend a physical every year.  If you have an ongoing health condition, like diabetes or high blood pressure, your doctor will probably want to see you more often.  What happens during a physical? -- In general, each visit will include:   Physical exam - The doctor or nurse will check your height, weight, heart rate, and blood pressure. They will also look at your eyes and ears. They will ask about how you are feeling and whether you have any symptoms that bother you.   Medicines - It's a good idea to bring a list of all the medicines you take to each doctor visit. Your doctor will talk to you about your medicines and answer any questions. Tell them if you are having any side effects that bother you. You " "should also tell them if you are having trouble paying for any of your medicines.   Habits and behaviors - This includes:   Your diet   Your exercise habits   Whether you smoke, drink alcohol, or use drugs   Whether you are sexually active   Whether you feel safe at home  Your doctor will talk to you about things you can do to improve your health and lower your risk of health problems. They will also offer help and support. For example, if you want to quit smoking, they can give you advice and might prescribe medicines. If you want to improve your diet or get more physical activity, they can help you with this, too.   Lab tests, if needed - The tests you get will depend on your age and situation. For example, your doctor might want to check your:   Cholesterol   Blood sugar   Iron level   Vaccines - The recommended vaccines will depend on your age, health, and what vaccines you already had. Vaccines are very important because they can prevent certain serious or deadly infections.   Discussion of screening - \"Screening\" means checking for diseases or other health problems before they cause symptoms. Your doctor can recommend screening based on your age, risk, and preferences. This might include tests to check for:   Cancer, such as breast, prostate, cervical, ovarian, colorectal, prostate, lung, or skin cancer   Sexually transmitted infections, such as chlamydia and gonorrhea   Mental health conditions like depression and anxiety  Your doctor will talk to you about the different types of screening tests. They can help you decide which screenings to have. They can also explain what the results might mean.   Answering questions - The physical is a good time to ask the doctor or nurse questions about your health. If needed, they can refer you to other doctors or specialists, too.  Adults older than 65 years often need other care, too. As you get older, your doctor will talk to you about:   How to prevent falling at " home   Hearing or vision tests   Memory testing   How to take your medicines safely   Making sure that you have the help and support you need at home  All topics are updated as new evidence becomes available and our peer review process is complete.  This topic retrieved from [employer] on: May 02, 2024.  Topic 686302 Version 1.0  Release: 32.4.3 - C32.122  © 2024 UpToDate, Inc. and/or its affiliates. All rights reserved.  Consumer Information Use and Disclaimer   Disclaimer: This generalized information is a limited summary of diagnosis, treatment, and/or medication information. It is not meant to be comprehensive and should be used as a tool to help the user understand and/or assess potential diagnostic and treatment options. It does NOT include all information about conditions, treatments, medications, side effects, or risks that may apply to a specific patient. It is not intended to be medical advice or a substitute for the medical advice, diagnosis, or treatment of a health care provider based on the health care provider's examination and assessment of a patient's specific and unique circumstances. Patients must speak with a health care provider for complete information about their health, medical questions, and treatment options, including any risks or benefits regarding use of medications. This information does not endorse any treatments or medications as safe, effective, or approved for treating a specific patient. UpToDate, Inc. and its affiliates disclaim any warranty or liability relating to this information or the use thereof.The use of this information is governed by the Terms of Use, available at https://www.woltersOwlparrotuwer.com/en/know/clinical-effectiveness-terms. 2024© UpToDate, Inc. and its affiliates and/or licensors. All rights reserved.  Copyright   © 2024 UpToDate, Inc. and/or its affiliates. All rights reserved.    Patient Education     Riesgos de cielo de la obesidad   Conceptos Básicos   Redactado  "por los médicos y editores de UpToDate   ¿Qué significa tener obesidad? -- Los médicos usan un cálculo llamado \"índice de masa corporal\", o \"IMC\", para determinar si mayank persona está por debajo del peso saludable, tiene un peso saludable, tiene sobrepeso o tiene obesidad.  Bustillo IMC indica en qué categoría se encuentra según bustillo peso y estatura   (figura 1):   Si bustillo IMC está entre 25 y 29.9, tiene sobrepeso.   Si bustillo IMC es 30 o más, tiene obesidad.  La obesidad es un problema, porque aumenta los riesgos de padecer muchos problemas de cielo. También puede dificultar el movimiento, la respiración u otras actividades que las personas con peso normal pueden hacer fácilmente.  ¿Cuáles son los riesgos de cielo de la obesidad? -- La obesidad aumenta el riesgo de desarrollar muchos problemas de cielo. Estos son solo algunos ejemplos:   Diabetes   Presión arterial stephen   Colesterol alto   Enfermedad cardíaca (por ejemplo, infartos)   Accidente cerebrovascular (derrame)   Apnea del sueño (un trastorno en el que la respiración se interrumpe payal periodos cortos al dormir)   Asma   Cáncer  ¿La obesidad acorta la hanna? -- Sí. En algunos estudios se stern demostrado que las personas que tienen obesidad mueren más jóvenes que aquellas que tienen un peso saludable. También se ha comprobado que cuanto más pesa mayank persona, mayor es el riesgo de muerte. El nathan de aumento del riesgo depende de la cantidad de tiempo que la persona haya tenido obesidad y de otros problemas de cielo que tenga.  Quienes tienen “obesidad central” también podrían correr el riesgo de morir más jóvenes. El término “obesidad central” significa que hay demasiado peso en el área del estómago, incluso si el IMC es normal.  ¿Sunshine consultar a un experto? -- Sí. Si tiene sobrepeso u obesidad, puede hablar con bustillo médico o enfermero. Es posible que le sugiera formas saludables de bajar de peso. También puede ser útil trabajar con un nutricionista (experto en alimentos y " nutrición). Un nutricionista puede ayudarlo a escoger alimentos saludables y a planificar las comidas.  ¿Existen tratamientos médicos para ayudarme a bajar de peso? -- Sí. Existen medicinas y cirugía para ayudar a bajar de peso, dinah esos tratamientos son solo para personas que no garcia podido adelgazar con cambios en el modo de hanna, tales mitzy la dieta y el ejercicio. Además, los tratamientos para bajar de peso no reemplazan la dieta o el ejercicio. Las personas que se hacen esos tratamientos también deben cambiar la forma en que comen y el nivel de actividad física que realizan.  ¿Qué puedo hacer para prevenir los problemas que la obesidad causa? -- Lo mejor que puede hacer es bajar de peso, dinah si eso no es posible, puede mejorar bustillo cielo y disminuir los riesgos tomando estas medidas:   Hacer más actividad física - Muchos tipos de actividad física pueden ser de ayuda; por ejemplo, caminar. Puede comenzar con algunos minutos por día y aumentar la cantidad a medida que desarrolle fuerza y resistencia. Cualquier cosa que ponga bustillo cuerpo en movimiento es buena.   Mejore bustillo dieta - Lo saludable es comer a intervalos regulares, consumir porciones más pequeñas y no saltear comidas. Limite los dulces y evite los alimentos procesados. En bustillo lugar, trate de comer más verduras y frutas.   Dejar de fumar (si fuma) - Algunas personas comienzan a comer más cuando sarita de fumar; por eso, trate de elegir alimentos saludables. Aunque le aumente el apetito, dejar de fumar es mayank de las mejores medidas que puede ward para mejorar bustillo cielo.   Limite el consumo de alcohol - Si es mayank persona de sexo femenino, no marlen más de mayank copa por día. Si es mayank persona de sexo masculino, no marlen más de dos copas por día.  ¿Cuáles son las causas de la obesidad? -- Lo que más aumenta el riesgo es tener un estilo de hanna poco saludable. La mayoría de las personas desarrollan obesidad porque comen demasiado, consumen alimentos poco saludables y  "se mueven muy poco. [place] les sucede especialmente a las personas que champ demasiada televisión. Sin embargo, también hay otros factores que parecen aumentar el riesgo de obesidad y que muchas personas no conocen. Estos son algunos factores que podrían afectar la posibilidad de desarrollar obesidad:   Hábitos de la madre payal el embarazo y después de alma delia - Las personas que consumen muchas calorías, tienen diabetes o fuman payal el embarazo tienen más posibilidades de tener hijos que tengan obesidad en la adultez. Además, los bebés que beben fórmula podrían tener más probabilidades que los bebés que beben leche materna de desarrollar obesidad más adelante.   Hábitos y aumento de peso en la infancia - Los niños o adolescentes que tienen sobrepeso u obesidad tienen más posibilidades de tener obesidad en la adultez.   Dormir muy poco - Las personas que no duermen suficiente tienen más posibilidades de desarrollar obesidad.   Gregory ciertas medicinas - El uso a den plazo de ciertas medicinas (por ejemplo, algunas medicinas para tratar la depresión) puede causar aumento de peso. Si le preocupa la posibilidad de que alguna de alexi medicinas le esté causando aumento de peso, hable con bustillo médico o enfermero. Podría cambiarle la medicina.   Ciertos padecimientos hormonales - Algunos problemas hormonales pueden aumentar el riesgo de obesidad. Por ejemplo, un padecimiento llamado \"síndrome de ovario poliquístico\" puede causar aumento de peso, además de otros síntomas, mitzy por ejemplo tener ciclos menstruales irregulares.  ¿Qué sucede si quiero quedar en embarazo? -- Si tiene sobrepeso u obesidad, podría ser más difícil quedar en embarazo. En el isidoro de las personas de sexo masculino, la obesidad también puede causar problemas sexuales, mitzy problemas para tener o mantener mayank erección. [place] es más probable si también tiene presión arterial stephen o diabetes.  ¿Qué hago si mi hijo tiene obesidad? -- En los niños, la " obesidad tiene muchos de los mismos riesgos que en los adultos. Por ejemplo, puede aumentar el riesgo de diabetes, presión arterial stephen, asma y apnea del sueño. También puede provocar otros problemas relacionados con la niñez. Por ejemplo, la obesidad puede hacer que los niños y las niñas crezcan más rápido de lo normal, y que las niñas atraviesen la pubertad antes de lo habitual.  Todos los artículos se actualizan a medida que se descubre nueva evidencia y culmina nuestro proceso de evaluación por homólogos   Alma Delia artículo se recuperó de UpToDate el: Mar 20, 2024.  Artículo 18744 Versión [id number].es-419.1  Release: 32.2.4 - C32.78  © 2024 UpToDate, Inc. Todos los derechos reservados.  figura 1: Bustillo índice de masa corporal (IMC)     Busque bustillo estatura (en pies y pulgadas) en la raphael superior. Luego, busque bustillo peso (en libras) en la primera columna. Ahora, busque el lugar donde se unen la columna de bustillo estatura con la raphael de bustillo peso. [place] es bustillo IMC. Por ejemplo, si mide 5 pies y 9 pulgadas y pesa 260 libras, bustillo IMC es de 38.  Gráfico 86680 Versión 4.0  Exención de responsabilidad y uso de la información del consumidor   Descargo de responsabilidad: esta información generalizada es un resumen limitado de información sobre el diagnóstico, el tratamiento y/o los medicamentos. No pretende ser exhaustiva y se debe utilizar mitzy herramienta para ayudar al usuario a comprender y/o evaluar las posibles opciones de diagnóstico y tratamiento. No incluye toda la información sobre afecciones, tratamientos, medicamentos, efectos secundarios o riesgos puedan ser aplicables a un paciente específico. No tiene el propósito de servir mitzy recomendación médica ni de sustituir la recomendación médica, el diagnóstico o el tratamiento de un profesional de atención médica que se base en el examen y la evaluación de alma delia profesional de la cielo respecto a las circunstancias específicas y únicas del paciente. Los pacientes deben hablar con un  profesional de atención médica para obtener información completa sobre bustillo cielo, cuestiones médicas y opciones de tratamiento, incluidos los riesgos o los beneficios relacionados con el uso de medicamentos. Esta información no certifica que los tratamientos o medicamentos sami seguros, eficaces o estén aprobados para tratar a un paciente específico. UpToDate, Inc. y alexi afiliados renuncian a cualquier garantía o responsabilidad relacionada con esta información o el uso de la misma.El uso de esta información está sujeto a las Condiciones de uso, disponibles en https://www.Smart Checkout.Movaya/en/know/clinical-effectiveness-terms. 2024© UpToDate, Inc. y alexi afiliados y/o licenciantes. Todos los derechos reservados.  Copyright   © 2024 UpToDate, Inc. Todos los derechos reservados.    Patient Education     Dieta para pérdida de peso   Acerca de alma delia champ   Existen muchas dietas para pérdida de peso “de moda” que son muy populares en la actualidad. Muchas de estas dietas pueden resultar más peligrosas que beneficiosas. La manera más saludable para perder peso es quemar más calorías de las que consume.  Mayank dieta para perder peso lo ayudará a adoptar un concepto saludable de la alimentación. NO es saludable dejar de comer para tratar de perder peso. Un buen plan de dieta lo ayudará a reducir la ingesta de comida y a ward decisiones saludables.  Mayank meta saludable para la pérdida de peso es de 0,5 a 1 kg (1 a 2 libras) por semana. Reducir calorías en bustillo dieta, quemar calorías a través del ejercicio, o ambas cosas, puede ayudarlo a perder peso. Combinar mayank dieta saludable con actividad física regular puede ayudarlo a obtener los mejores resultados.  Para reducir las calorías en bustillo dieta, usted puede hacer lo siguiente:  Cambiar de leche entera a leche con 1 % de grasa o leche descremada.  Cambiar el queso normal por el queso con bajo contenido de grasa o sin grasa.  Usar condimentos más saludables:  Crema agria o aderezos  para ensalada con bajo contenido de grasa o sin grasa.  Mantequilla en aerosol.  Jarabes o gelatinas dietéticos en lugar de los comunes  Consumir yogurt congelado en lugar de helado mitzy postre.  Evitar las araceli fritas. Consumir refrigerios que consistan en zanahorias, verduras o fruta. Si es fanático de las araceli, intente consumirlas horneadas y no fritas y controle el tamaño de las porciones.  Coma shane a la finesse, asadas, hervidas, asadas o al horno. Evite las frituras en aceite abundante. Elija aves de ricks sin piel, carne shirin magra, ivy magros de carne de cerdo y pescado para obtener lizarraga de proteínas buenas.  Intente beber breana saborizadas sin calorías. No rhonda refrescos ni jugos que tengan demasiadas calorías.  Elija fruta en lugar de dulces.  General   Puede resultar beneficioso comer comidas más pequeñas. [place] evitará que coma en exceso en bustillo próxima comida. Además, comer despacio lo ayudará a llenarse más rápidamente.  Si tener 3 comidas diarias es parte de bustillo estilo de hanna, elija consumir más cantidad de proteínas magras y alimentos con alto contenido de fibra en cada comida para sentirse satisfecho.  No omita comidas. Generalmente, si se saltea mayank comida, tendrá más hambre en la próxima comida.  Coma porciones pequeñas. Use un plato o recipiente más pequeño para las comidas o, cuando coma fuera de bustillo casa, coma la mitad y guarde la otra mitad para comer en bustillo casa.  Planifique. Planifique alexi comidas y la lista para hacer las compras antes de ir a la thom. La planificación evitará que pida comida afuera.  No vaya al almacén cuando tenga hambre. Es más probable que compre refrigerios que no son buenos para usted.  Racione los refrigerios. Cuando esté comiendo un refrigerio, en lugar de consumir todo el paquete, tome mayank pequeña cantidad para tener un punto dónde detenerse.  Rhonda agua antes y después de las comidas para llenarse sin calorías.  Cuando consuma alimentos con almidón, elija  productos con granos integrales. Estos contienen mucha fibra y harán que se sienta satisfecho. La fibra también ayuda a disminuir el colesterol y ayuda con el funcionamiento de los intestinos.  Si necesita ayuda para comenzar, pídale al médico que lo derive a un dietista para que lo ayude a perder peso.         ¿Cuáles serán los resultados?   La pérdida del peso excesivo hará que todo bustillo cuerpo esté más saludable. Tendrá más energía para las actividades diarias y disminuirá el riesgo de sufrir problemas de cielo.  ¿Qué cambios en la forma de hanna se necesitan?   Manténgase físicamente activo. [place] jb no siempre es suficiente para perder peso. Quemar las calorías a través del ejercicio es mayank parte importante de la pérdida de peso.  ¿Qué alimentos pueden comerse?   La clave es controlar el tamaño de las porciones. Es mejor elegir alimentos que tengan un bajo contenido de grasas y calorías.  Elija shane magras:  Pechuga de martha sin hueso y sin piel  Lomo de cerdo  Carne de res 90 % magra  Carne de pavo magra  Pescado fresco (no frito)  Elija productos lácteos con bajo contenido de grasa:  Leche con 1 % de grasa o leche descremada  Mantequilla o margarina en aerosol  Queso con bajo contenido de grasa o sin grasa  Yogurt congelado o helado bajo en calorías  Elija frutas frescas, vegetales, frijoles y lentejas y productos con dandy integral más a menudo.  Elija consumir agua con más frecuencia. Consuma bebidas dietéticas o sin calorías cuando quiera beber otra cosa. Trate de obtener alexi calorías de los alimentos que consume.  Elija refrigerios inteligentes:  Frutas  Verduras  Yogur con bajo contenido de grasa o sin grasa  Queso con bajo contenido de grasa o sin grasa, mitzy el queso cottage  Frutas secas (nueces, almendras, etc.) sin sal  Huevo cocido  Hummus  Guacamole  Mantequilla de maní natural  Palomitas de maíz sin mantequilla: saborizar con jannette, ajo u otra especia  Galletas saladas integrales  ¿Qué  alimentos deben comerse con moderación o deben evitarse?   Limite los alimentos con alto contenido de grasa, sodio y calorías mitzy:  Alimentos fritos  Kt procesadas  Productos lácteos enteros  Dulces, galletas dulces, araceli fritas, productos de panadería  Salchichas, tocino y las kt con grasa  Refrescos, jugos  Cerveza, vino y combinados (alcohol)  ¿Será necesario algún otro cuidado?   ¿Qué dar hacer antes de intentar perder peso?  Hable con el médico y el dietista para saber si necesita bajar de peso. Establezca las metas de pérdida de peso con ayuda de ellos.  Si tiene mayank enfermedad crónica, mitzy nivel elevado de azúcar en la sania o presión arterial stephen, pregúntele al médico o al dietista qué dieta y qué tipo de ejercicios son adecuados para usted.  Pregúntele al médico sobre qué cantidad de ejercicio puede realizar y qué tipo de ejercicio es adecuado para usted.  Consejos útiles   Lleve un diario de alimentos para que sea más fácil llegar a alexi objetivos.  Únase a un deshaun de apoyo.  Consejos para quemar calorías:  Si bustillo lugar de trabajo se encuentra cerca de bustillo casa, camine o vaya en bicicleta en lugar de conducir.  Eric caminatas de 20 minutos todos los días. [place] payal bustillo descanso para el almuerzo. No sólo quemará calorías sino que también aumentará bustillo energía para el dale del día.  Use las escaleras en lugar del elevador.  Anótese en un gimnasio o en mayank clase de ejercicios con un amigo.  Intente hacer ejercicio 30 minutos al día para bustillo cielo en general. También puede realizar catrina sesiones de 10 minutos. Tenga un objetivo de 60 a 90 minutos al día para perder peso.  Rhonda mucha cantidad de agua antes, payal y después de hacer ejercicios.  ¿Dónde puedo obtener más información?   Weight-Control Information Network  http://win.niddk.nih.gov/publications/for_life.htm   http://www.win.niddk.nih.gov/publications/better_health.htm   Exención de responsabilidad y uso de la información del  consumidor   Esta información general es un resumen limitado de la información sobre el diagnóstico, el tratamiento y/o la medicación. No pretende ser exhaustivo y debe utilizarse mitzy mayank herramienta para ayudar al usuario a comprender y/o evaluar las posibles opciones de diagnóstico y tratamiento. NO incluye toda la información sobre las enfermedades, los tratamientos, los medicamentos, los efectos secundarios o los riesgos que pueden aplicarse a un paciente específico. No tiene por objeto ser un consejo médico ni un sustituto del consejo médico. [place] pretende reemplazar al diagnóstico o el tratamiento proporcionados por un proveedor de atención médica con base en el examen y la evaluación por parte de alma delia proveedor de las circunstancias específicas y únicas de un paciente. Los pacientes deben hablar con un proveedor de atención médica para obtener información completa sobre butsillo cielo, preguntas médicas y opciones de tratamiento, incluidos los riesgos o beneficios relacionados con el uso de medicamentos. Esta información no respalda ningún tratamiento o medicamento mitzy seguro, eficaz o aprobado para tratar a un paciente específico. UpToDate, Inc. y alexi afiliados renuncian a cualquier garantía o responsabilidad relacionada con esta información o con el uso que se dada de esta. El uso de esta información se rige por las Condiciones de uso, disponibles en https://www.Ik[employer]er.com/en/know/clinical-effectiveness-terms   Copyright   Copyright © 2024 UpToDate, Inc. y alexi licenciantes y/o afiliados. Todos los derechos reservados.    Patient Education     Colesterol alto   Conceptos Básicos   Redactado por los médicos y editores de UpToDate   ¿Qué es el colesterol? -- El colesterol es mayank sustancia que se encuentra en la sania. Todos tenemos un poco de colesterol, ya que es necesario para tener buena cielo. El problema es que a veces las personas tienen demasiado colesterol.  En comparación con las personas con  "colesterol normal, las personas con colesterol alto corren mayor riesgo de infarto, accidente cerebrovascular (derrame) y otros problemas de cielo. A más colesterol, más riesgo de tener estos problemas.  ¿Existen distintas clases de colesterol? -- Sí, hay distintas clases. Si le hacen mayank prueba de colesterol, es posible que escuche a bustillo médico o enfermero hablar de:   Colesterol total   Colesterol LDL - Algunos lo llaman colesterol \"juarez\". Eso es porque tener niveles altos de LDL aumenta bustillo riesgo de infarto, accidente cerebrovascular (derrame) y otros problemas de cielo.   Colesterol HDL - Algunos lo llaman colesterol \"marroquin\". Eso es porque las personas con niveles altos de HDL tienden a tener un reducir riesgo de infarto, accidente cerebrovascular (derrame) y otros problemas de cielo.   Colesterol no HDL - El colesterol no HDL es bustillo colesterol total menos bustillo colesterol HDL.   Triglicéridos - Los triglicéridos no son colesterol. Son otro tipo de grasa, dinah a menudo se miden junto con el colesterol. (Los triglicéridos altos también parecen aumentar el riesgo de infarto y accidente cerebrovascular [derrame]).  ¿Cuáles deberían ser mis números? -- Pregunte a bustillo médico o enfermero cuáles deberían ser alexi números. Cada persona necesita distintas metas. Si vive fuera de [place] consulte la tabla (tabla 1).  En general, las personas que no tienen enfermedad cardíaca deberían apuntar a tener:   Nivel de colesterol total haven de 200   Nivel de colesterol LDL haven de 130, o mucho más bajo, si tienen riesgo de infarto o accidente cerebrovascular (derrame)   Nivel de colesterol HDL mayor de 60   Nivel de colesterol no HDL haven de 160, o más bajo, si tienen riesgo de infarto o accidente cerebrovascular (derrame)   Nivel de triglicéridos haven de 150  Recuerde, sin embargo, que muchas personas que no pueden alcanzar estas metas siguen teniendo bajo riesgo de infarto y accidente cerebrovascular (derrame).  ¿Qué dar hacer " "si tengo colesterol alto? -- Pregúntele a bustillo médico cuál es bustillo riesgo general de infarto y accidente cerebrovascular (derrame). El simple hecho de tener colesterol alto no siempre es motivo de preocupación. Tener colesterol alto es solo mayank de muchas cosas que pueden aumentar bustillo riesgo de infarto y accidente cerebrovascular (derrame).  Otras cosas que aumentan bustillo riesgo incluyen:   Fumar   Presión arterial stephen   Tener un padre, madre, hermana o flavio que desarrolló enfermedad cardíaca siendo joven - Joven, en alma delia isidoro, significa haven de 55 años en las personas de sexo masculino y haven de 65 años en las personas de sexo femenino.   Mayank dieta no saludable para el corazón - Mayank dieta \"saludable para el corazón\" incluye muchas frutas y verduras, fibra y grasas saludables (mitzy las del pescado, los yovany secos y ciertos aceites). Además, en alma delia tipo de dieta se limita el consumo de azúcar y grasas no saludables.   Edad avanzada  Si tiene riesgo alto de infarto y accidente cerebrovascular (derrame), tener el colesterol alto es un problema. Sin embargo, si bustillo riesgo es bajo, es posible que el colesterol alto no necesite tratamiento.  ¿[place] ward medicinas para reducir el colesterol? -- No todas las personas que tienen colesterol alto deben ward medicinas. Bustillo médico o enfermero decidirá si las necesita de acuerdo con bustillo edad, bustillo historia familiar y otras cuestiones de cielo.  Hay muchas medicinas distintas que se usan para reducir el colesterol (tabla 2). Algunas ayudan al organismo a producir menos colesterol. Algunas impiden que el organismo absorba el colesterol de los alimentos. Algunas ayudan al organismo a eliminar el colesterol más rápido. Las medicinas que más se suelen usar para tratar el colesterol alto se llaman \"estatinas\".  Probablemente deba ward mayank estatina si:   Ya tuvo un infarto o accidente cerebrovascular (derrame)   Se sabe que tiene mayank enfermedad cardíaca   Tiene diabetes   Tiene un " "padecimiento llamado \"enfermedad arterial periférica\", que causa dolor al caminar y se produce cuando las arterias de las piernas se bloquean con depósitos grasos   Tiene un \"aneurisma aórtico abdominal\", que es un ensanchamiento de la arteria principal del área del estómago  La mayoría de las personas con alguno de los padecimientos mencionados arriba debe ward estatina, sin importar simon nivel de colesterol. Si simon médico o enfermero le receta mayank estatina, es importante que siga tomándola. Es probable que no sienta ningún cambio con esta medicina, dinah puede ayudar a prevenir los infartos, los accidentes cerebrovasculares (derrames) y la muerte.  Si simon médico o enfermero le recomienda que use mayank medicina mitzy ayuda para bajar el colesterol, asegúrese de saber cómo se llama. Siga todas las instrucciones sobre cómo usarla. Por ejemplo, algunas medicinas actúan mejor si se las usa por la noche. Algunas se deben ward con la comida.  Dígale a simon médico o enfermero si la medicina le causa algún efecto secundario molesto. Podría cambiarla por otra.  ¿Puedo bajar mi colesterol sin medicinas? -- Sí. Para ayudar a reducir el colesterol puede hacer lo siguiente:   Puede bajar el colesterol LDL, o \"juarez\", si roge las shane [place], la mantequilla, las frituras, el queso y otros alimentos que tienen mucha grasa saturada.   Para reducir los triglicéridos, evite los alimentos azucarados, las comidas fritas y el exceso de alcohol.   Si tiene sobrepeso, bajar de peso puede ayudar. Simon médico o enfermero puede ayudarlo a hacerlo de forma saludable.   Trate de hacer actividad física regularmente. Incluso los tipos de ejercicio suaves, mitzy caminar, son buenos para la cielo.  Aun si estas medidas no logran bajar mucho simon colesterol, pueden mejorar simon cielo de muchas otras formas.  Todos los artículos se actualizan a medida que se descubre nueva evidencia y culmina nuestro proceso de evaluación por homólogos   Adela artículo se " recuperó de UpToDate el: Mar 01, 2024.  Artículo 47268 Versión 23.0.es-419.1  Release: 32.2.4 - C32.59  © 2024 [facility] [employer] Todos los derechos reservados.  tabla 1: Medidas del colesterol y de los triglicéridos utilizadas en [place] y en los demás países     Medida utilizada en [place] Miligramos/decilitro (mg/dL)  Medida utilizada en la mayoría de las ubicaciones fuera de [place] Milimoles/litro (mmol/litro)     Nivel al que apuntar  Nivel al que apuntar    Colesterol total  Menos de 200 Menos de 5.17   Colesterol LDL  Menos de 130, o mucho menos si corre riesgo de infarto y accidente cerebrovascular (derrame) Menos de 3.36, o mucho menos si corre riesgo de infarto y accidente cerebrovascular (derrame)   Colesterol HDL  Más de 60 Más de 1.55   Triglicéridos  Menos de 150 Menos de 1.7   En los [place] el colesterol se mide de manera diferente que en la mayoría de los demás países. En esta tabla se muestran los valores utilizados en [place] y en otros países. Se incluyen los niveles de colesterol y triglicéridos óptimos para la mayoría de las personas que no tienen enfermedad cardíaca.  Gráfico 78916 Versión 5.0  tabla 2: Medicinas para bajar los lípidos  Nombre genérico  Teodora comercial    Estatinas    Atorvastatina Lipitor   Fluvastatina Lescol, Lescol XL   Lovastatina Mevacor, Altoprev   Pitavastatina Livalo   Pravastatina Pravachol   Rosuvastatina Crestor   Simvastatina Zocor   Inhibidores de la PCSK9    Alirocumab Praluent   Evolocumab Repatha, Repatha SureClick   Inhibidores de la absorción del colesterol    Ezetimiba Zetia   Secuestrantes de ácidos biliares    Colestiramina Prevalite, Questran, Questran Light   Colesevelam Welchol   Colestipol Colestid   Niacina (ácido nicotínico)    Niacina de liberación inmediata     Niacina de liberación prolongada Niaspan   Fibratos    Fenofibrato Fenoglide, Tricor, Triglide y otros   Gemfibrozil Lopid   Las marcas comerciales mencionadas corresponden a medicinas  disponibles en [place] y algunos otros países.  Gráfico 98937 Versión 7.0  Exención de responsabilidad y uso de la información del consumidor   Descargo de responsabilidad: esta información generalizada es un resumen limitado de información sobre el diagnóstico, el tratamiento y/o los medicamentos. No pretende ser exhaustiva y se debe utilizar mitzy herramienta para ayudar al usuario a comprender y/o evaluar las posibles opciones de diagnóstico y tratamiento. No incluye toda la información sobre afecciones, tratamientos, medicamentos, efectos secundarios o riesgos puedan ser aplicables a un paciente específico. No tiene el propósito de servir mitzy recomendación médica ni de sustituir la recomendación médica, el diagnóstico o el tratamiento de un profesional de atención médica que se base en el examen y la evaluación de alma delia profesional de la cielo respecto a las circunstancias específicas y únicas del paciente. Los pacientes deben hablar con un profesional de atención médica para obtener información completa sobre bustillo cielo, cuestiones médicas y opciones de tratamiento, incluidos los riesgos o los beneficios relacionados con el uso de medicamentos. Esta información no certifica que los tratamientos o medicamentos sami seguros, eficaces o estén aprobados para tratar a un paciente específico. Up[employer] y alexi afiliados renuncian a cualquier garantía o responsabilidad relacionada con esta información o el uso de la misma.El uso de esta información está sujeto a las Condiciones de uso, disponibles en https://www.Obviousuwer.com/en/know/clinical-effectiveness-terms. 2024© [employer] y alexi afiliados y/o licenciantes. Todos los derechos reservados.  Copyright   © 2024 [employer]. Todos los derechos reservados.

## 2025-03-18 NOTE — TELEPHONE ENCOUNTER
Sched by [facility]    Scheduled date of colonoscopy (as of today): 04/07/2025  Physician performing colonoscopy: Dr. Acuña  Location of colonoscopy: [facility]  Bowel prep reviewed with patient: ADARSH/DUL  Prep instructions sent via email  Instructions reviewed with patient by: ZACK  Clearances: N/A

## 2025-04-05 ENCOUNTER — NURSE TRIAGE (OUTPATIENT)
Dept: OTHER | Facility: OTHER | Age: 50
End: 2025-04-05

## 2025-04-05 NOTE — TELEPHONE ENCOUNTER
"Regarding: colonoscopy prep questions/missing medications  ----- Message from Skylar FRAZIER sent at 4/5/2025  4:40 PM EDT -----  Pt stated, \"I am scheduled for a colonoscopy Monday and was instructed to go to the pharmacy to  medications for the prep. I am at the pharmacy and there are no medications for me here. I have questions about the prep.\"    "

## 2025-04-05 NOTE — TELEPHONE ENCOUNTER
"FOLLOW UP: none    REASON FOR CONVERSATION: Medication Management    SYMPTOMS: none    OTHER: For colonoscopy Monday    DISPOSITION: Home Care (Information or Advice Only Call)  Reason for Disposition   Caller has medicine question only, adult not sick, AND triager answers question    Answer Assessment - Initial Assessment Questions  1. NAME of MEDICINE: \"What medicine(s) are you calling about?\"      Dulcolax and Miralax  2. QUESTION: \"What is your question?\" (e.g., double dose of medicine, side effect)      This medication is not at the pharmacy,Do I need to buy it?  3. PRESCRIBER: \"Who prescribed the medicine?\" Reason: if prescribed by specialist, call should be referred to that group.      Colorectal service  4. SYMPTOMS: \"Do you have any symptoms?\" If Yes, ask: \"What symptoms are you having?\"  \"How bad are the symptoms (e.g., mild, moderate, severe)      For colonoscopy Monday    Protocols used: Medication Question Call-Adult-    "

## 2025-04-07 ENCOUNTER — HOSPITAL ENCOUNTER (OUTPATIENT)
Dept: GASTROENTEROLOGY | Facility: HOSPITAL | Age: 50
Setting detail: OUTPATIENT SURGERY
Discharge: HOME | End: 2025-04-07
Attending: INTERNAL MEDICINE
Payer: COMMERCIAL

## 2025-04-07 ENCOUNTER — ANESTHESIA EVENT (OUTPATIENT)
Dept: GASTROENTEROLOGY | Facility: HOSPITAL | Age: 50
End: 2025-04-07
Payer: COMMERCIAL

## 2025-04-07 ENCOUNTER — ANESTHESIA (OUTPATIENT)
Dept: GASTROENTEROLOGY | Facility: HOSPITAL | Age: 50
End: 2025-04-07
Payer: COMMERCIAL

## 2025-04-07 VITALS
RESPIRATION RATE: 18 BRPM | SYSTOLIC BLOOD PRESSURE: 117 MMHG | BODY MASS INDEX: 26.37 KG/M2 | TEMPERATURE: 97.4 F | DIASTOLIC BLOOD PRESSURE: 73 MMHG | HEART RATE: 74 BPM | HEIGHT: 68 IN | WEIGHT: 174 LBS | OXYGEN SATURATION: 98 %

## 2025-04-07 DIAGNOSIS — Z12.11 SCREENING FOR COLORECTAL CANCER: ICD-10-CM

## 2025-04-07 DIAGNOSIS — Z12.11 SCREENING FOR COLON CANCER: ICD-10-CM

## 2025-04-07 DIAGNOSIS — Z12.12 SCREENING FOR COLORECTAL CANCER: ICD-10-CM

## 2025-04-07 DIAGNOSIS — Z13.9 SCREENING DUE: ICD-10-CM

## 2025-04-07 PROCEDURE — 88305 TISSUE EXAM BY PATHOLOGIST: CPT | Performed by: SPECIALIST

## 2025-04-07 PROCEDURE — 45380 COLONOSCOPY AND BIOPSY: CPT | Performed by: COLON & RECTAL SURGERY

## 2025-04-07 RX ORDER — PROPOFOL 10 MG/ML
INJECTION, EMULSION INTRAVENOUS AS NEEDED
Status: DISCONTINUED | OUTPATIENT
Start: 2025-04-07 | End: 2025-04-07

## 2025-04-07 RX ORDER — PROPOFOL 10 MG/ML
INJECTION, EMULSION INTRAVENOUS CONTINUOUS PRN
Status: DISCONTINUED | OUTPATIENT
Start: 2025-04-07 | End: 2025-04-07

## 2025-04-07 RX ORDER — SODIUM CHLORIDE, SODIUM LACTATE, POTASSIUM CHLORIDE, CALCIUM CHLORIDE 600; 310; 30; 20 MG/100ML; MG/100ML; MG/100ML; MG/100ML
INJECTION, SOLUTION INTRAVENOUS CONTINUOUS PRN
Status: DISCONTINUED | OUTPATIENT
Start: 2025-04-07 | End: 2025-04-07

## 2025-04-07 RX ORDER — LIDOCAINE HYDROCHLORIDE 10 MG/ML
INJECTION, SOLUTION EPIDURAL; INFILTRATION; INTRACAUDAL; PERINEURAL AS NEEDED
Status: DISCONTINUED | OUTPATIENT
Start: 2025-04-07 | End: 2025-04-07

## 2025-04-07 RX ADMIN — PROPOFOL 30 MG: 10 INJECTION, EMULSION INTRAVENOUS at 10:00

## 2025-04-07 RX ADMIN — LIDOCAINE HYDROCHLORIDE 50 MG: 10 INJECTION, SOLUTION EPIDURAL; INFILTRATION; INTRACAUDAL at 09:56

## 2025-04-07 RX ADMIN — PROPOFOL 140 MCG/KG/MIN: 10 INJECTION, EMULSION INTRAVENOUS at 09:57

## 2025-04-07 RX ADMIN — PROPOFOL 120 MG: 10 INJECTION, EMULSION INTRAVENOUS at 09:56

## 2025-04-07 RX ADMIN — SODIUM CHLORIDE, SODIUM LACTATE, POTASSIUM CHLORIDE, AND CALCIUM CHLORIDE: .6; .31; .03; .02 INJECTION, SOLUTION INTRAVENOUS at 09:49

## 2025-04-07 NOTE — H&P
History and Physical   Colon and Rectal Surgery   Charly Nair 50 y.o. male MRN: [id number]  Unit/Bed#:  Encounter: [id number]  04/07/25   @NOW    No chief complaint on file.        History of Present Illness   HPI:  Charly Nair is a 50 y.o. male who presents for screening colonoscopy.      Historical Information   No past medical history on file.  Past Surgical History:   Procedure Laterality Date    CYSTOSCOPY      Diagnostic. Last assessed 7/31/2014        Meds/Allergies     Not in a hospital admission.      Current Outpatient Medications:     fluorometholone acetate (FLAREX) 0.1 % ophthalmic suspension, Apply 1 drop to eye 4 (four) times a day, Disp: , Rfl:     Loteprednol Etabonate 0.25 % SUSP, Apply to eye 2 (two) times a day, Disp: , Rfl:     Perfluorohexyloctane 1.338 GM/ML SOLN, Apply to eye 2 (two) times a day, Disp: , Rfl:     No Known Allergies      Social History   Social History     Substance and Sexual Activity   Alcohol Use No     Social History     Substance and Sexual Activity   Drug Use Never     Social History     Tobacco Use   Smoking Status Never   Smokeless Tobacco Never         Family History:   Family History   Problem Relation Age of Onset    Arthritis Mother     Stroke Father     Heart disease Father     No Known Problems Sister     No Known Problems Brother     No Known Problems Son     No Known Problems Daughter     No Known Problems Sister     No Known Problems Brother          Objective     Current Vitals:      No intake or output data in the 24 hours ending 04/07/25 0922    Physical Exam:  General:  Resting comfortably in bed   Eyes:Sclera anicteric  ENT: Trachea midline  Pulm:  Symmetric chest raise.  No respiratory Distress  CV:  Regular on monitor  Abdomen:  Soft NT ND  Extremities:  No clubbing/ cyanosis/ edema    Lab Results: I have personally reviewed pertinent lab results.    Imaging: Results Review Statement: No pertinent imaging studies  reviewed.      ASSESSMENT:  Charly Nair is a 50 y.o. male who presents for outpatient colonoscopy.      PLAN:  For colonoscopy    Risks/ Benefits reviewed to include but not limited to anesthesia, bleeding, missed lesions, and colonoscopic perforation requiring surgery.

## 2025-04-07 NOTE — ANESTHESIA POSTPROCEDURE EVALUATION
Post-Op Assessment Note    CV Status:  Stable  Pain Score: 0    Pain management: adequate       Mental Status:  Alert and awake   Hydration Status:  Euvolemic   PONV Controlled:  Controlled   Airway Patency:  Patent     Post Op Vitals Reviewed: Yes    No anethesia notable event occurred.    Staff: Anesthesiologist, with CRNAs           Last Filed PACU Vitals:  Vitals Value Taken Time   Temp 97.4 °F (36.3 °C) 04/07/25 1031   Pulse 74 04/07/25 1031   /73 04/07/25 1031   Resp 18 04/07/25 1031   SpO2 98 % 04/07/25 1031       Modified Akash:     Vitals Value Taken Time   Activity 2 04/07/25 1031   Respiration 2 04/07/25 1031   Circulation 2 04/07/25 1031   Consciousness 2 04/07/25 1031   Oxygen Saturation 2 04/07/25 1031     Modified Akash Score: 10

## 2025-04-07 NOTE — ANESTHESIA POSTPROCEDURE EVALUATION
Post-Op Assessment Note    CV Status:  Stable  Pain Score: 0    Pain management: adequate       Mental Status:  Sleepy and arousable   PONV Controlled:  None   Airway Patency:  Patent     Post Op Vitals Reviewed: Yes    No anethesia notable event occurred.    Staff: CRNA           Last Filed PACU Vitals:  Vitals Value Taken Time   Temp 97    Pulse 78    /78    Resp 14    SpO2 99

## 2025-04-07 NOTE — ANESTHESIA PREPROCEDURE EVALUATION
Procedure:  AMB REFERRAL TO GASTROENTEROLOGY  COLONOSCOPY    Relevant Problems   MUSCULOSKELETAL   (+) Chronic right-sided low back pain with right-sided sciatica      NEURO/PSYCH   (+) Chronic right-sided low back pain with right-sided sciatica        Physical Exam    Airway    Mallampati score: I  TM Distance: >3 FB  Neck ROM: full     Dental   No notable dental hx     Cardiovascular      Pulmonary      Other Findings        Anesthesia Plan  ASA Score- 1     Anesthesia Type- IV sedation with anesthesia with ASA Monitors.         Additional Monitors:     Airway Plan:            Plan Factors-Exercise tolerance (METS): >4 METS.    Chart reviewed.    Patient summary reviewed.    Patient is not a current smoker.      There is medical exclusion for perioperative obstructive sleep apnea risk education.        Induction- intravenous.    Postoperative Plan-         Informed Consent- Anesthetic plan and risks discussed with patient.  I personally reviewed this patient with the CRNA. Discussed and agreed on the Anesthesia Plan with the CRNA..      NPO Status:  Vitals Value Taken Time   Date of last liquid 04/07/25 04/07/25 0938   Time of last liquid 0400 04/07/25 0938   Date of last solid 04/06/25 04/07/25 0938   Time of last solid 1000 04/07/25 0938

## 2025-04-09 ENCOUNTER — RESULTS FOLLOW-UP (OUTPATIENT)
Dept: OTHER | Facility: HOSPITAL | Age: 50
End: 2025-04-09

## 2025-04-09 PROCEDURE — 88305 TISSUE EXAM BY PATHOLOGIST: CPT | Performed by: SPECIALIST

## 2025-04-09 NOTE — LETTER
April 10, 2025     Charly Nair  [address]  [place] [address]      Dear Mr. Nair:    Below are the results from your recent visit: Colonoscopy     Resulted Orders   Tissue Exam   Result Value Ref Range    Case Report       Surgical Pathology Report                         Case: [id number]                                  Authorizing Provider:  Aaron Acuña MD   Collected:           04/07/2025 1003              Ordering Location:     [facility]   Received:            04/07/2025 1130                                     Endoscopy                                                                    Pathologist:           Vandana Rose MD                                                     Specimen:    Large Intestine, Transverse Colon, cold bx, transverse colon polyp                         Final Diagnosis       A. Large Intestine, cold bx, transverse colon polyp:    - Tubular adenoma.     - Negative for high grade dysplasia.       Additional Information       All reported additional testing was performed with appropriately reactive controls.  These tests were developed and their performance characteristics determined by [facility] or appropriate performing facility, though some tests may be performed on tissues which have not been validated for performance characteristics (such as staining performed on alcohol exposed cell blocks and decalcified tissues).  Results should be interpreted with caution and in the context of the patients’ clinical condition. These tests may not be cleared or approved by the U.S. Food and Drug Administration, though the FDA has determined that such clearance or approval is not necessary. These tests are used for clinical purposes and they should not be regarded as investigational or for research. This laboratory has been approved by CLIA 88, designated as a high-complexity laboratory and is qualified to perform  "these tests.  .      Synoptic Checklist          COLON/RECTUM POLYP FORM - GI - A          :    Adenoma(s)      Gross Description       A. The specimen is received in formalin, labeled with the patient's name and hospital number, and is designated \" transverse colon polyp\".  The specimen consists of a single tan soft tissue fragment measuring 0.3 cm in greatest dimension.  Entirely submitted. Screened cassette.    Note: The estimated total formalin fixation time based upon information provided by the submitting clinician and the standard processing schedule is under 72 hours.    -KEmeigh       The test results show benign adenoma/polyp.     If you have any questions or concerns, please don't hesitate to call.         Sincerely,        Aaron Acuña MD  "

## 2025-04-17 PROBLEM — Z12.11 SCREENING FOR COLORECTAL CANCER: Status: RESOLVED | Noted: 2025-03-18 | Resolved: 2025-04-17

## 2025-04-17 PROBLEM — Z12.12 SCREENING FOR COLORECTAL CANCER: Status: RESOLVED | Noted: 2025-03-18 | Resolved: 2025-04-17

## 2025-04-17 PROBLEM — Z13.6 SCREENING FOR CARDIOVASCULAR CONDITION: Status: RESOLVED | Noted: 2025-03-18 | Resolved: 2025-04-17

## 2025-04-17 PROBLEM — Z13.9 SCREENING DUE: Status: RESOLVED | Noted: 2025-03-18 | Resolved: 2025-04-17

## 2025-04-17 PROBLEM — Z13.1 SCREENING FOR DIABETES MELLITUS: Status: RESOLVED | Noted: 2025-03-18 | Resolved: 2025-04-17

## 2025-06-12 ENCOUNTER — PROCEDURE VISIT (OUTPATIENT)
Dept: UROLOGY | Facility: AMBULATORY SURGERY CENTER | Age: 50
End: 2025-06-12

## 2025-06-12 VITALS
HEIGHT: 68 IN | BODY MASS INDEX: 26.67 KG/M2 | DIASTOLIC BLOOD PRESSURE: 82 MMHG | HEART RATE: 88 BPM | OXYGEN SATURATION: 96 % | SYSTOLIC BLOOD PRESSURE: 140 MMHG | WEIGHT: 176 LBS

## 2025-06-12 DIAGNOSIS — Z12.5 SCREENING FOR PROSTATE CANCER: ICD-10-CM

## 2025-06-12 DIAGNOSIS — R30.0 DYSURIA: Primary | ICD-10-CM

## 2025-06-12 LAB
SL AMB  POCT GLUCOSE, UA: NORMAL
SL AMB LEUKOCYTE ESTERASE,UA: NORMAL
SL AMB POCT BILIRUBIN,UA: NORMAL
SL AMB POCT BLOOD,UA: NORMAL
SL AMB POCT CLARITY,UA: CLEAR
SL AMB POCT COLOR,UA: YELLOW
SL AMB POCT KETONES,UA: NORMAL
SL AMB POCT NITRITE,UA: NORMAL
SL AMB POCT PH,UA: 6
SL AMB POCT SPECIFIC GRAVITY,UA: 1.03
SL AMB POCT URINE PROTEIN: NORMAL
SL AMB POCT UROBILINOGEN: 0.2

## 2025-06-12 PROCEDURE — 52000 CYSTOURETHROSCOPY: CPT | Performed by: UROLOGY

## 2025-06-12 PROCEDURE — 81002 URINALYSIS NONAUTO W/O SCOPE: CPT | Performed by: UROLOGY

## 2025-06-12 NOTE — PROGRESS NOTES
Cystoscopy     Date/Time  6/12/2025 8:00 AM     Performed by  Raji Kay MD   Authorized by  Raji Kay MD         Procedure Details:  Procedure type: cystoscopy      Charly is a 50-year-old male with a history of dysuria and discharge.  Recent chlamydia and gonorrhea testing is normal.  There are no prior PSA levels available for my review.  He had upper tract imaging with a CTA chest abdomen pelvis performed in July 2024 which is normal.  He presents today for cystoscopy to assess his urethra.      Male cystoscopy procedure note:  Risk and benefits of flexible cystoscopy were discussed. Informed consent was obtained. The patient was placed in the supine position. His genitalia was prepped and draped in a sterile fashion. Viscous lidocaine jelly was instilled into the urethra and flexible cystoscopy was then performed. The urethra, prostatic urethra, and bladder were all thoroughly inspected there was no evidence of mucosal abnormalities or lesions. Both ureteral orifices were visualized with clear efflux of urine. Retroflexion was normal. Overall this was a negative cystoscopy.    Impression: History of dysuria, normal cystoscopy    Plan: Provided the patient with reassurance that axial imaging in July 2024 is normal and that cystoscopy today in the office is normal as well.  Urinalysis and urine culture has been ordered if he believes he has an infection.  Follow-up will be in 1 year or sooner if needed.  PSA testing recommended at that time as well.

## 2025-06-24 ENCOUNTER — TELEPHONE (OUTPATIENT)
Dept: INTERNAL MEDICINE CLINIC | Facility: CLINIC | Age: 50
End: 2025-06-24